# Patient Record
Sex: MALE | Race: WHITE | Employment: OTHER | URBAN - NONMETROPOLITAN AREA
[De-identification: names, ages, dates, MRNs, and addresses within clinical notes are randomized per-mention and may not be internally consistent; named-entity substitution may affect disease eponyms.]

---

## 2017-01-18 RX ORDER — ALPRAZOLAM 0.5 MG/1
TABLET ORAL
Qty: 120 TABLET | Refills: 2 | Status: SHIPPED | OUTPATIENT
Start: 2017-01-18 | End: 2017-03-23 | Stop reason: SDUPTHER

## 2017-03-24 RX ORDER — ALPRAZOLAM 0.5 MG/1
TABLET ORAL
Qty: 120 TABLET | Refills: 2 | Status: SHIPPED | OUTPATIENT
Start: 2017-03-24 | End: 2017-05-30 | Stop reason: SDUPTHER

## 2017-04-28 ENCOUNTER — TELEPHONE (OUTPATIENT)
Dept: FAMILY MEDICINE CLINIC | Age: 57
End: 2017-04-28

## 2017-06-06 ENCOUNTER — OFFICE VISIT (OUTPATIENT)
Dept: FAMILY MEDICINE CLINIC | Age: 57
End: 2017-06-06

## 2017-06-06 VITALS
SYSTOLIC BLOOD PRESSURE: 130 MMHG | HEIGHT: 71 IN | TEMPERATURE: 97.6 F | WEIGHT: 158.6 LBS | HEART RATE: 69 BPM | BODY MASS INDEX: 22.2 KG/M2 | DIASTOLIC BLOOD PRESSURE: 80 MMHG | OXYGEN SATURATION: 91 %

## 2017-06-06 DIAGNOSIS — G89.29 CHRONIC LOW BACK PAIN, UNSPECIFIED BACK PAIN LATERALITY, WITH SCIATICA PRESENCE UNSPECIFIED: ICD-10-CM

## 2017-06-06 DIAGNOSIS — R10.84 ABDOMINAL PAIN, GENERALIZED: ICD-10-CM

## 2017-06-06 DIAGNOSIS — M54.5 CHRONIC LOW BACK PAIN, UNSPECIFIED BACK PAIN LATERALITY, WITH SCIATICA PRESENCE UNSPECIFIED: ICD-10-CM

## 2017-06-06 DIAGNOSIS — R53.83 OTHER MALAISE AND FATIGUE: ICD-10-CM

## 2017-06-06 DIAGNOSIS — R53.81 OTHER MALAISE AND FATIGUE: ICD-10-CM

## 2017-06-06 DIAGNOSIS — Z12.5 SCREENING PSA (PROSTATE SPECIFIC ANTIGEN): ICD-10-CM

## 2017-06-06 DIAGNOSIS — R63.4 WEIGHT LOSS: ICD-10-CM

## 2017-06-06 DIAGNOSIS — K58.2 IRRITABLE BOWEL SYNDROME WITH BOTH CONSTIPATION AND DIARRHEA: ICD-10-CM

## 2017-06-06 DIAGNOSIS — R63.4 WEIGHT LOSS: Primary | ICD-10-CM

## 2017-06-06 DIAGNOSIS — F10.11 HISTORY OF ALCOHOL ABUSE: ICD-10-CM

## 2017-06-06 LAB
ALBUMIN SERPL-MCNC: 4.5 G/DL (ref 3.9–4.9)
ALP BLD-CCNC: 79 U/L (ref 35–104)
ALT SERPL-CCNC: 10 U/L (ref 0–41)
ANION GAP SERPL CALCULATED.3IONS-SCNC: 11 MEQ/L (ref 7–13)
AST SERPL-CCNC: 13 U/L (ref 0–40)
BILIRUB SERPL-MCNC: 0.4 MG/DL (ref 0–1.2)
BUN BLDV-MCNC: 15 MG/DL (ref 6–20)
C-REACTIVE PROTEIN: <0.3 MG/L (ref 0–5)
CALCIUM SERPL-MCNC: 9.2 MG/DL (ref 8.6–10.2)
CHLORIDE BLD-SCNC: 99 MEQ/L (ref 98–107)
CO2: 28 MEQ/L (ref 22–29)
CREAT SERPL-MCNC: 0.57 MG/DL (ref 0.7–1.2)
GFR AFRICAN AMERICAN: >60
GFR NON-AFRICAN AMERICAN: >60
GLOBULIN: 2.2 G/DL (ref 2.3–3.5)
GLUCOSE BLD-MCNC: 76 MG/DL (ref 74–109)
HCT VFR BLD CALC: 39.5 % (ref 42–52)
HEMOGLOBIN: 13.1 G/DL (ref 14–18)
MCH RBC QN AUTO: 31.7 PG (ref 27–31.3)
MCHC RBC AUTO-ENTMCNC: 33.1 % (ref 33–37)
MCV RBC AUTO: 95.7 FL (ref 80–100)
PDW BLD-RTO: 14.2 % (ref 11.5–14.5)
PLATELET # BLD: 212 K/UL (ref 130–400)
POTASSIUM SERPL-SCNC: 4.4 MEQ/L (ref 3.5–5.1)
PREALBUMIN: 26 MG/DL (ref 20–40)
PROSTATE SPECIFIC ANTIGEN: 2.4 NG/ML (ref 0–3.89)
RBC # BLD: 4.13 M/UL (ref 4.7–6.1)
SEDIMENTATION RATE, ERYTHROCYTE: 5 MM (ref 0–20)
SODIUM BLD-SCNC: 138 MEQ/L (ref 132–144)
TOTAL PROTEIN: 6.7 G/DL (ref 6.4–8.1)
TSH SERPL DL<=0.05 MIU/L-ACNC: 3.12 UIU/ML (ref 0.27–4.2)
WBC # BLD: 4.3 K/UL (ref 4.8–10.8)

## 2017-06-06 PROCEDURE — 99213 OFFICE O/P EST LOW 20 MIN: CPT | Performed by: FAMILY MEDICINE

## 2017-06-06 PROCEDURE — G8599 NO ASA/ANTIPLAT THER USE RNG: HCPCS | Performed by: FAMILY MEDICINE

## 2017-06-06 PROCEDURE — G8420 CALC BMI NORM PARAMETERS: HCPCS | Performed by: FAMILY MEDICINE

## 2017-06-06 PROCEDURE — 3017F COLORECTAL CA SCREEN DOC REV: CPT | Performed by: FAMILY MEDICINE

## 2017-06-06 PROCEDURE — 1036F TOBACCO NON-USER: CPT | Performed by: FAMILY MEDICINE

## 2017-06-06 PROCEDURE — G8427 DOCREV CUR MEDS BY ELIG CLIN: HCPCS | Performed by: FAMILY MEDICINE

## 2017-06-06 RX ORDER — GABAPENTIN 600 MG/1
TABLET ORAL
Qty: 180 TABLET | Refills: 5 | Status: SHIPPED | OUTPATIENT
Start: 2017-06-06 | End: 2018-05-07 | Stop reason: SDUPTHER

## 2017-06-06 ASSESSMENT — PATIENT HEALTH QUESTIONNAIRE - PHQ9
6. FEELING BAD ABOUT YOURSELF - OR THAT YOU ARE A FAILURE OR HAVE LET YOURSELF OR YOUR FAMILY DOWN: 0
SUM OF ALL RESPONSES TO PHQ QUESTIONS 1-9: 8
SUM OF ALL RESPONSES TO PHQ9 QUESTIONS 1 & 2: 3
9. THOUGHTS THAT YOU WOULD BE BETTER OFF DEAD, OR OF HURTING YOURSELF: 0
7. TROUBLE CONCENTRATING ON THINGS, SUCH AS READING THE NEWSPAPER OR WATCHING TELEVISION: 1
8. MOVING OR SPEAKING SO SLOWLY THAT OTHER PEOPLE COULD HAVE NOTICED. OR THE OPPOSITE, BEING SO FIGETY OR RESTLESS THAT YOU HAVE BEEN MOVING AROUND A LOT MORE THAN USUAL: 0
5. POOR APPETITE OR OVEREATING: 0
2. FEELING DOWN, DEPRESSED OR HOPELESS: 1
4. FEELING TIRED OR HAVING LITTLE ENERGY: 3
3. TROUBLE FALLING OR STAYING ASLEEP: 1
1. LITTLE INTEREST OR PLEASURE IN DOING THINGS: 2

## 2017-06-08 LAB — TESTOSTERONE TOTAL-MALE: 692 NG/DL (ref 300–890)

## 2017-06-09 ENCOUNTER — TELEPHONE (OUTPATIENT)
Dept: FAMILY MEDICINE CLINIC | Age: 57
End: 2017-06-09

## 2017-06-15 ENCOUNTER — HOSPITAL ENCOUNTER (OUTPATIENT)
Dept: CT IMAGING | Age: 57
Discharge: HOME | End: 2017-06-15
Payer: COMMERCIAL

## 2017-06-15 VITALS
BODY MASS INDEX: 21.7 KG/M2 | SYSTOLIC BLOOD PRESSURE: 119 MMHG | HEIGHT: 71 IN | WEIGHT: 155 LBS | DIASTOLIC BLOOD PRESSURE: 80 MMHG | HEART RATE: 75 BPM | RESPIRATION RATE: 16 BRPM

## 2017-06-15 DIAGNOSIS — F10.11 HISTORY OF ALCOHOL ABUSE: ICD-10-CM

## 2017-06-15 DIAGNOSIS — K58.2 IRRITABLE BOWEL SYNDROME WITH BOTH CONSTIPATION AND DIARRHEA: ICD-10-CM

## 2017-06-15 DIAGNOSIS — R10.84 ABDOMINAL PAIN, GENERALIZED: ICD-10-CM

## 2017-06-15 DIAGNOSIS — R63.4 WEIGHT LOSS: ICD-10-CM

## 2017-06-15 PROCEDURE — 74177 CT ABD & PELVIS W/CONTRAST: CPT

## 2017-06-15 PROCEDURE — 6360000004 HC RX CONTRAST MEDICATION: Performed by: RADIOLOGY

## 2017-06-15 PROCEDURE — 2500000003 HC RX 250 WO HCPCS: Performed by: RADIOLOGY

## 2017-06-15 RX ORDER — SODIUM CHLORIDE 0.9 % (FLUSH) 0.9 %
10 SYRINGE (ML) INJECTION ONCE
Status: DISCONTINUED | OUTPATIENT
Start: 2017-06-15 | End: 2017-06-18 | Stop reason: HOSPADM

## 2017-06-15 RX ADMIN — IOPAMIDOL 100 ML: 755 INJECTION, SOLUTION INTRAVENOUS at 14:45

## 2017-06-15 RX ADMIN — BARIUM SULFATE 450 ML: 21 SUSPENSION ORAL at 14:45

## 2017-06-19 ENCOUNTER — TELEPHONE (OUTPATIENT)
Dept: FAMILY MEDICINE CLINIC | Age: 57
End: 2017-06-19

## 2017-06-19 DIAGNOSIS — R93.5 ABNORMAL ABDOMINAL CT SCAN: Primary | ICD-10-CM

## 2017-10-19 ENCOUNTER — HOSPITAL ENCOUNTER (OUTPATIENT)
Dept: CARDIOLOGY | Age: 57
Discharge: HOME | End: 2017-10-19
Payer: COMMERCIAL

## 2017-10-19 PROCEDURE — 93296 REM INTERROG EVL PM/IDS: CPT

## 2017-10-24 ENCOUNTER — TELEPHONE (OUTPATIENT)
Dept: FAMILY MEDICINE CLINIC | Age: 57
End: 2017-10-24

## 2017-10-24 NOTE — TELEPHONE ENCOUNTER
Rosalie xiong nurse from [facility]. [phone].  She is case management if she can be of any assistance with the pt's care needs

## 2017-12-12 RX ORDER — TAMSULOSIN HYDROCHLORIDE 0.4 MG/1
0.4 CAPSULE ORAL DAILY
Qty: 90 CAPSULE | Refills: 3 | Status: SHIPPED | OUTPATIENT
Start: 2017-12-12 | End: 2018-11-28 | Stop reason: SDUPTHER

## 2018-01-18 ENCOUNTER — HOSPITAL ENCOUNTER (OUTPATIENT)
Dept: CARDIOLOGY | Age: 58
Discharge: HOME | End: 2018-01-18
Payer: COMMERCIAL

## 2018-01-18 PROCEDURE — 93296 REM INTERROG EVL PM/IDS: CPT

## 2018-03-05 ENCOUNTER — TELEPHONE (OUTPATIENT)
Dept: FAMILY MEDICINE CLINIC | Age: 58
End: 2018-03-05

## 2018-03-06 RX ORDER — ALPRAZOLAM 0.5 MG/1
TABLET ORAL
Qty: 120 TABLET | Refills: 2 | Status: SHIPPED | OUTPATIENT
Start: 2018-03-06 | End: 2018-03-12

## 2018-03-12 ENCOUNTER — OFFICE VISIT (OUTPATIENT)
Dept: FAMILY MEDICINE CLINIC | Age: 58
End: 2018-03-12
Payer: COMMERCIAL

## 2018-03-12 VITALS
HEIGHT: 71 IN | RESPIRATION RATE: 12 BRPM | HEART RATE: 54 BPM | WEIGHT: 152 LBS | DIASTOLIC BLOOD PRESSURE: 72 MMHG | BODY MASS INDEX: 21.28 KG/M2 | SYSTOLIC BLOOD PRESSURE: 110 MMHG

## 2018-03-12 DIAGNOSIS — R11.0 NAUSEA: Primary | ICD-10-CM

## 2018-03-12 DIAGNOSIS — K57.30 DIVERTICULOSIS OF LARGE INTESTINE WITHOUT HEMORRHAGE: ICD-10-CM

## 2018-03-12 DIAGNOSIS — I42.9 CARDIOMYOPATHY, UNSPECIFIED TYPE (HCC): ICD-10-CM

## 2018-03-12 DIAGNOSIS — G89.29 CHRONIC LOW BACK PAIN, UNSPECIFIED BACK PAIN LATERALITY, WITH SCIATICA PRESENCE UNSPECIFIED: ICD-10-CM

## 2018-03-12 DIAGNOSIS — M54.5 CHRONIC LOW BACK PAIN, UNSPECIFIED BACK PAIN LATERALITY, WITH SCIATICA PRESENCE UNSPECIFIED: ICD-10-CM

## 2018-03-12 DIAGNOSIS — D64.9 ANEMIA, UNSPECIFIED TYPE: ICD-10-CM

## 2018-03-12 DIAGNOSIS — K27.9 PEPTIC ULCER DISEASE: ICD-10-CM

## 2018-03-12 DIAGNOSIS — K58.0 IRRITABLE BOWEL SYNDROME WITH DIARRHEA: ICD-10-CM

## 2018-03-12 DIAGNOSIS — I25.119 CORONARY ARTERY DISEASE INVOLVING NATIVE HEART WITH ANGINA PECTORIS, UNSPECIFIED VESSEL OR LESION TYPE (HCC): ICD-10-CM

## 2018-03-12 DIAGNOSIS — E78.5 HYPERLIPIDEMIA, UNSPECIFIED HYPERLIPIDEMIA TYPE: ICD-10-CM

## 2018-03-12 PROCEDURE — G8420 CALC BMI NORM PARAMETERS: HCPCS | Performed by: FAMILY MEDICINE

## 2018-03-12 PROCEDURE — 1036F TOBACCO NON-USER: CPT | Performed by: FAMILY MEDICINE

## 2018-03-12 PROCEDURE — 3017F COLORECTAL CA SCREEN DOC REV: CPT | Performed by: FAMILY MEDICINE

## 2018-03-12 PROCEDURE — G8427 DOCREV CUR MEDS BY ELIG CLIN: HCPCS | Performed by: FAMILY MEDICINE

## 2018-03-12 PROCEDURE — G8483 FLU IMM NO ADMIN DOC REA: HCPCS | Performed by: FAMILY MEDICINE

## 2018-03-12 PROCEDURE — G8599 NO ASA/ANTIPLAT THER USE RNG: HCPCS | Performed by: FAMILY MEDICINE

## 2018-03-12 PROCEDURE — 99214 OFFICE O/P EST MOD 30 MIN: CPT | Performed by: FAMILY MEDICINE

## 2018-03-12 RX ORDER — ONDANSETRON 4 MG/1
4 TABLET, ORALLY DISINTEGRATING ORAL EVERY 8 HOURS PRN
Qty: 30 TABLET | Refills: 2 | Status: SHIPPED | OUTPATIENT
Start: 2018-03-12 | End: 2019-02-20

## 2018-03-12 NOTE — PROGRESS NOTES
Chief Complaint   Patient presents with    Annual Exam     saw Dr. Mary Aguilar last June got a letter about seeing [facility]     check left ear, fluid in ear fof 5 days    Nausea     would like medication that does not cause constipation        HPI:  Nura Service is a 59 y.o. male    Checkup/review chronic issues    Nausea, requesting med    Would like left ear checked for infection/fluid    He has a pacemaker  Nonischemic cardiomyopathy  Follows with cardiology  Dr. Екатерина Rios  Pacemaker checks every 3 months, in person every 6 mos  Will have echo  Taking lipitor    Hx substance abuse  Was taken off all controlled substances at one time  Xanax was restarted   Takes 4 xanax/day    IBS history  Can go 7 times a day, remains bloated, and feels like inadequate emptying  linzess at higher dose caused diarrhea    Patient Active Problem List   Diagnosis    Generalized pain    High risk medication use    IBS (irritable bowel syndrome)    Anxiety    Kidney disease    Anemia    Diverticulosis    Peptic ulcer disease    CAD (coronary artery disease)    Substance abuse    Chronic back pain    Chemical dependency ([address]    Cardiomyopathy [facility] [address]    Hyperlipidemia       Current Outpatient Prescriptions   Medication Sig Dispense Refill    ondansetron (ZOFRAN ODT) 4 MG disintegrating tablet Take 1 tablet by mouth every 8 hours as needed for Nausea or Vomiting 30 tablet 2    tamsulosin (FLOMAX) 0.4 MG capsule Take 1 capsule by mouth daily 90 capsule 3    desonide (DESOWEN) 0.05 % cream apply to affected area twice a day 15 g 3    gabapentin (NEURONTIN) 600 MG tablet TAKE 1 TABLET BY MOUTH SIX TIMES A  tablet 5    linaclotide (LINZESS) 145 MCG capsule Take 1 capsule by mouth every morning (before breakfast) 30 capsule 3    Nerve Stimulator (ALEVE DIRECT THERAPY TENS) DREW Use daily.   Dx: chronic back pain 1 Device 0    ALPRAZolam (XANAX) 0.5 MG tablet TAKE 1 TO 2 TABLETS BY MOUTH THREE TIMES A DAY AS

## 2018-04-19 ENCOUNTER — HOSPITAL ENCOUNTER (OUTPATIENT)
Dept: CARDIOLOGY | Age: 58
Discharge: HOME | End: 2018-04-19
Payer: COMMERCIAL

## 2018-04-19 PROCEDURE — 93296 REM INTERROG EVL PM/IDS: CPT

## 2018-05-01 RX ORDER — ALPRAZOLAM 0.5 MG/1
TABLET ORAL
Qty: 120 TABLET | Refills: 2 | Status: SHIPPED | OUTPATIENT
Start: 2018-05-01 | End: 2018-06-26 | Stop reason: SDUPTHER

## 2018-05-07 RX ORDER — GABAPENTIN 600 MG/1
TABLET ORAL
Qty: 180 TABLET | Refills: 5 | Status: SHIPPED | OUTPATIENT
Start: 2018-05-07 | End: 2018-11-15 | Stop reason: SDUPTHER

## 2018-08-24 DIAGNOSIS — F41.9 ANXIETY: ICD-10-CM

## 2018-08-27 RX ORDER — ALPRAZOLAM 0.5 MG/1
TABLET ORAL
Qty: 120 TABLET | Refills: 2 | Status: SHIPPED | OUTPATIENT
Start: 2018-08-27 | End: 2018-10-25 | Stop reason: SDUPTHER

## 2018-10-25 DIAGNOSIS — F41.9 ANXIETY: ICD-10-CM

## 2018-10-26 RX ORDER — ALPRAZOLAM 0.5 MG/1
TABLET ORAL
Qty: 120 TABLET | Refills: 2 | Status: SHIPPED | OUTPATIENT
Start: 2018-10-26 | End: 2018-12-31 | Stop reason: SDUPTHER

## 2018-11-15 ENCOUNTER — TELEPHONE (OUTPATIENT)
Dept: UROLOGY | Age: 58
End: 2018-11-15

## 2018-11-15 DIAGNOSIS — N13.8 BPH WITH OBSTRUCTION/LOWER URINARY TRACT SYMPTOMS: Primary | ICD-10-CM

## 2018-11-15 DIAGNOSIS — N40.1 BPH WITH OBSTRUCTION/LOWER URINARY TRACT SYMPTOMS: Primary | ICD-10-CM

## 2018-11-15 RX ORDER — GABAPENTIN 600 MG/1
TABLET ORAL
Qty: 180 TABLET | Refills: 5 | Status: SHIPPED | OUTPATIENT
Start: 2018-11-15 | End: 2019-05-23 | Stop reason: SDUPTHER

## 2018-11-28 RX ORDER — TAMSULOSIN HYDROCHLORIDE 0.4 MG/1
CAPSULE ORAL
Qty: 90 CAPSULE | Refills: 3 | Status: SHIPPED | OUTPATIENT
Start: 2018-11-28 | End: 2019-02-20

## 2018-12-31 DIAGNOSIS — F41.9 ANXIETY: ICD-10-CM

## 2018-12-31 RX ORDER — ALPRAZOLAM 0.5 MG/1
TABLET ORAL
Qty: 120 TABLET | Refills: 2 | Status: SHIPPED | OUTPATIENT
Start: 2018-12-31 | End: 2019-02-20

## 2019-01-22 RX ORDER — DESONIDE 0.5 MG/G
CREAM TOPICAL
Qty: 15 G | Refills: 3 | Status: SHIPPED | OUTPATIENT
Start: 2019-01-22 | End: 2019-02-20

## 2019-02-20 ENCOUNTER — OFFICE VISIT (OUTPATIENT)
Dept: FAMILY MEDICINE CLINIC | Age: 59
End: 2019-02-20
Payer: COMMERCIAL

## 2019-02-20 VITALS
HEIGHT: 71 IN | RESPIRATION RATE: 16 BRPM | BODY MASS INDEX: 20.58 KG/M2 | SYSTOLIC BLOOD PRESSURE: 108 MMHG | DIASTOLIC BLOOD PRESSURE: 80 MMHG | WEIGHT: 147 LBS

## 2019-02-20 DIAGNOSIS — N28.9 KIDNEY DISEASE: ICD-10-CM

## 2019-02-20 DIAGNOSIS — D64.9 ANEMIA, UNSPECIFIED TYPE: ICD-10-CM

## 2019-02-20 DIAGNOSIS — I25.119 CORONARY ARTERY DISEASE INVOLVING NATIVE HEART WITH ANGINA PECTORIS, UNSPECIFIED VESSEL OR LESION TYPE (HCC): ICD-10-CM

## 2019-02-20 DIAGNOSIS — M54.5 CHRONIC LOW BACK PAIN, UNSPECIFIED BACK PAIN LATERALITY, WITH SCIATICA PRESENCE UNSPECIFIED: ICD-10-CM

## 2019-02-20 DIAGNOSIS — E78.5 HYPERLIPIDEMIA, UNSPECIFIED HYPERLIPIDEMIA TYPE: ICD-10-CM

## 2019-02-20 DIAGNOSIS — F19.10 SUBSTANCE ABUSE (HCC): ICD-10-CM

## 2019-02-20 DIAGNOSIS — F41.9 ANXIETY: ICD-10-CM

## 2019-02-20 DIAGNOSIS — R52 GENERALIZED PAIN: ICD-10-CM

## 2019-02-20 DIAGNOSIS — F19.20 CHEMICAL DEPENDENCY (HCC): ICD-10-CM

## 2019-02-20 DIAGNOSIS — I42.9 CARDIOMYOPATHY, UNSPECIFIED TYPE (HCC): Primary | ICD-10-CM

## 2019-02-20 DIAGNOSIS — K58.0 IRRITABLE BOWEL SYNDROME WITH DIARRHEA: ICD-10-CM

## 2019-02-20 DIAGNOSIS — G89.29 CHRONIC LOW BACK PAIN, UNSPECIFIED BACK PAIN LATERALITY, WITH SCIATICA PRESENCE UNSPECIFIED: ICD-10-CM

## 2019-02-20 DIAGNOSIS — Z79.899 HIGH RISK MEDICATION USE: ICD-10-CM

## 2019-02-20 LAB
ALBUMIN SERPL-MCNC: 4.4 G/DL (ref 3.5–4.6)
ALP BLD-CCNC: 65 U/L (ref 35–104)
ALT SERPL-CCNC: 8 U/L (ref 0–41)
ANION GAP SERPL CALCULATED.3IONS-SCNC: 13 MEQ/L (ref 9–15)
AST SERPL-CCNC: 16 U/L (ref 0–40)
BILIRUB SERPL-MCNC: 0.4 MG/DL (ref 0.2–0.7)
BUN BLDV-MCNC: 15 MG/DL (ref 8–23)
CALCIUM SERPL-MCNC: 9 MG/DL (ref 8.5–9.9)
CHLORIDE BLD-SCNC: 98 MEQ/L (ref 95–107)
CHOLESTEROL, TOTAL: 221 MG/DL (ref 0–199)
CO2: 27 MEQ/L (ref 20–31)
CREAT SERPL-MCNC: 0.75 MG/DL (ref 0.7–1.2)
GFR AFRICAN AMERICAN: >60
GFR NON-AFRICAN AMERICAN: >60
GLOBULIN: 2.8 G/DL (ref 2.3–3.5)
GLUCOSE BLD-MCNC: 74 MG/DL (ref 70–99)
HCT VFR BLD CALC: 43.7 % (ref 42–52)
HDLC SERPL-MCNC: 101 MG/DL (ref 40–59)
HEMOGLOBIN: 14.7 G/DL (ref 14–18)
LDL CHOLESTEROL CALCULATED: 105 MG/DL (ref 0–129)
MCH RBC QN AUTO: 34.4 PG (ref 27–31.3)
MCHC RBC AUTO-ENTMCNC: 33.8 % (ref 33–37)
MCV RBC AUTO: 101.8 FL (ref 80–100)
PDW BLD-RTO: 12.9 % (ref 11.5–14.5)
PLATELET # BLD: 214 K/UL (ref 130–400)
POTASSIUM SERPL-SCNC: 4.1 MEQ/L (ref 3.4–4.9)
RBC # BLD: 4.29 M/UL (ref 4.7–6.1)
SODIUM BLD-SCNC: 138 MEQ/L (ref 135–144)
TOTAL PROTEIN: 7.2 G/DL (ref 6.3–8)
TRIGL SERPL-MCNC: 73 MG/DL (ref 0–150)
TSH SERPL DL<=0.05 MIU/L-ACNC: 3.81 UIU/ML (ref 0.44–3.86)
WBC # BLD: 4.4 K/UL (ref 4.8–10.8)

## 2019-02-20 PROCEDURE — G8427 DOCREV CUR MEDS BY ELIG CLIN: HCPCS | Performed by: FAMILY MEDICINE

## 2019-02-20 PROCEDURE — 1036F TOBACCO NON-USER: CPT | Performed by: FAMILY MEDICINE

## 2019-02-20 PROCEDURE — G8599 NO ASA/ANTIPLAT THER USE RNG: HCPCS | Performed by: FAMILY MEDICINE

## 2019-02-20 PROCEDURE — 3017F COLORECTAL CA SCREEN DOC REV: CPT | Performed by: FAMILY MEDICINE

## 2019-02-20 PROCEDURE — G8420 CALC BMI NORM PARAMETERS: HCPCS | Performed by: FAMILY MEDICINE

## 2019-02-20 PROCEDURE — G8483 FLU IMM NO ADMIN DOC REA: HCPCS | Performed by: FAMILY MEDICINE

## 2019-02-20 PROCEDURE — 99214 OFFICE O/P EST MOD 30 MIN: CPT | Performed by: FAMILY MEDICINE

## 2019-02-20 RX ORDER — POLYETHYLENE GLYCOL 3350 17 G/17G
17 POWDER, FOR SOLUTION ORAL DAILY
Qty: 30 EACH | Refills: 11 | Status: SHIPPED | OUTPATIENT
Start: 2019-02-20 | End: 2020-05-05

## 2019-02-20 ASSESSMENT — PATIENT HEALTH QUESTIONNAIRE - PHQ9
2. FEELING DOWN, DEPRESSED OR HOPELESS: 0
SUM OF ALL RESPONSES TO PHQ9 QUESTIONS 1 & 2: 0
SUM OF ALL RESPONSES TO PHQ QUESTIONS 1-9: 0
1. LITTLE INTEREST OR PLEASURE IN DOING THINGS: 0
SUM OF ALL RESPONSES TO PHQ QUESTIONS 1-9: 0

## 2019-04-30 DIAGNOSIS — F41.9 ANXIETY: ICD-10-CM

## 2019-04-30 RX ORDER — ALPRAZOLAM 0.5 MG/1
TABLET ORAL
Qty: 120 TABLET | Refills: 2 | Status: SHIPPED | OUTPATIENT
Start: 2019-04-30 | End: 2019-06-26 | Stop reason: SDUPTHER

## 2019-05-23 RX ORDER — GABAPENTIN 600 MG/1
TABLET ORAL
Qty: 180 TABLET | Refills: 5 | Status: SHIPPED | OUTPATIENT
Start: 2019-05-23 | End: 2020-04-02

## 2019-10-20 DIAGNOSIS — F41.9 ANXIETY: ICD-10-CM

## 2019-10-22 RX ORDER — ALPRAZOLAM 0.5 MG/1
TABLET ORAL
Qty: 120 TABLET | Refills: 2 | Status: SHIPPED | OUTPATIENT
Start: 2019-10-22 | End: 2019-12-17 | Stop reason: SDUPTHER

## 2019-10-29 DIAGNOSIS — N40.1 BPH WITH OBSTRUCTION/LOWER URINARY TRACT SYMPTOMS: Primary | ICD-10-CM

## 2019-10-29 DIAGNOSIS — N13.8 BPH WITH OBSTRUCTION/LOWER URINARY TRACT SYMPTOMS: Primary | ICD-10-CM

## 2019-10-30 RX ORDER — TAMSULOSIN HYDROCHLORIDE 0.4 MG/1
0.4 CAPSULE ORAL DAILY
Qty: 90 CAPSULE | Refills: 3 | Status: SHIPPED | OUTPATIENT
Start: 2019-10-30 | End: 2020-05-05 | Stop reason: SDUPTHER

## 2019-12-17 DIAGNOSIS — F41.9 ANXIETY: ICD-10-CM

## 2019-12-18 RX ORDER — ALPRAZOLAM 0.5 MG/1
TABLET ORAL
Qty: 120 TABLET | Refills: 0 | Status: SHIPPED | OUTPATIENT
Start: 2019-12-18 | End: 2020-01-09

## 2020-01-08 NOTE — TELEPHONE ENCOUNTER
Pharmacy  requesting medication refill.  Please approve or deny this request.    Rx requested:  Requested Prescriptions     Pending Prescriptions Disp Refills    ALPRAZolam (XANAX) 0.5 MG tablet [Pharmacy Med Name: ALPRAZOLAM 0.5 MG TABLET] 120 tablet      Sig: take 1 to 2 tablets by mouth three times a day if needed for sleep         Last Office Visit:   2/20/2019      Next Visit Date:  Future Appointments   Date Time Provider Yareli Torrie   1/16/2020  2:45 PM Esmer Núñez MD [facility]

## 2020-01-09 RX ORDER — ALPRAZOLAM 0.5 MG/1
TABLET ORAL
Qty: 120 TABLET | Refills: 2 | Status: SHIPPED | OUTPATIENT
Start: 2020-01-09 | End: 2020-03-06

## 2020-03-06 RX ORDER — ALPRAZOLAM 0.5 MG/1
TABLET ORAL
Qty: 120 TABLET | Refills: 2 | Status: SHIPPED | OUTPATIENT
Start: 2020-03-06 | End: 2020-05-05 | Stop reason: SDUPTHER

## 2020-03-06 RX ORDER — DESONIDE 0.5 MG/G
CREAM TOPICAL
Qty: 15 G | Refills: 3 | Status: SHIPPED | OUTPATIENT
Start: 2020-03-06 | End: 2021-08-05

## 2020-04-02 RX ORDER — GABAPENTIN 600 MG/1
TABLET ORAL
Qty: 180 TABLET | Refills: 5 | Status: SHIPPED | OUTPATIENT
Start: 2020-04-02 | End: 2020-07-06

## 2020-05-05 ENCOUNTER — OFFICE VISIT (OUTPATIENT)
Dept: FAMILY MEDICINE CLINIC | Age: 60
End: 2020-05-05
Payer: COMMERCIAL

## 2020-05-05 VITALS — HEIGHT: 71 IN | WEIGHT: 155 LBS | BODY MASS INDEX: 21.7 KG/M2

## 2020-05-05 PROCEDURE — 99442 PR PHYS/QHP TELEPHONE EVALUATION 11-20 MIN: CPT | Performed by: FAMILY MEDICINE

## 2020-05-05 ASSESSMENT — PATIENT HEALTH QUESTIONNAIRE - PHQ9
SUM OF ALL RESPONSES TO PHQ QUESTIONS 1-9: 0
SUM OF ALL RESPONSES TO PHQ QUESTIONS 1-9: 0
2. FEELING DOWN, DEPRESSED OR HOPELESS: 0
1. LITTLE INTEREST OR PLEASURE IN DOING THINGS: 0
SUM OF ALL RESPONSES TO PHQ9 QUESTIONS 1 & 2: 0

## 2020-05-05 NOTE — PROGRESS NOTES
Substance abuse ([facility] [address]     ULTRAM         Review of Systems  Otherwise physically feels healthy without sob/palpitations/chest pain/f/c/n/v/weight gain/weight loss/abd pain    Prior to Visit Medications    Medication Sig Taking? Authorizing Provider   gabapentin (NEURONTIN) 600 MG tablet take 1 tablet by mouth six times a day Yes Sonia Patton MD   ALPRAZolam (XANAX) 0.5 MG tablet TAKE 1 TO 2 TABLETS BY MOUTH THREE TIMES A DAY AS NEEDED FOR SLEEP Yes Sonia Patton MD   tamsulosin (FLOMAX) 0.4 MG capsule take 1 capsule by mouth once daily Yes Sonia Patton MD   omeprazole (PRILOSEC) 40 MG capsule Take 40 mg by mouth daily. Yes Historical Provider, MD   carvedilol (COREG) 6.25 MG tablet Take 6.25 mg by mouth 2 times daily (with meals). Yes Historical Provider, MD   lisinopril (ZESTRIL) 2.5 MG tablet Take 2.5 mg by mouth daily. Yes Historical Provider, MD   desonide (DESOWEN) 0.05 % cream APPLY TO THE AFFECTED AREA TWICE A DAY  Sonia Patton MD       Social History     Tobacco Use    Smoking status: Former Smoker     Packs/day: 1.50     Years: 20.00     Pack years: 30.00     Types: Cigarettes     Last attempt to quit: 10/13/1997     Years since quittin.5    Smokeless tobacco: Never Used   Substance Use Topics    Alcohol use: No     Comment: H/O SUBSTANCE ABUSE INCLUDING ALCOHOL    Drug use: No     Comment: CHEMICAL DEPENDENCY/ADDICTION            PHYSICAL EXAMINATION:  Ht 5' 11\" (1.803 m)   Wt 155 lb (70.3 kg)   BMI 21.62 kg/m²     No exam  Phone visit  He sounds a little fragmented on the phone  Reports that he has a triscuit stuck in his throat    Due to this being a TeleHealth encounter, evaluation of the following organ systems is limited: Vitals/Constitutional/EENT/Resp/CV/GI//MS/Neuro/Skin/Heme-Lymph-Imm.     Lab Results   Component Value Date    WBC 4.4 (L) 2019    HGB 14.7 2019    HCT 43.7 2019     2019    CHOL 221 (H) 2019    TRIG 73 2019     (H) 02/20/2019    ALT 8 02/20/2019    AST 16 02/20/2019     02/20/2019    K 4.1 02/20/2019    CL 98 02/20/2019    CREATININE 0.75 02/20/2019    BUN 15 02/20/2019    CO2 27 02/20/2019    TSH 3.810 02/20/2019    PSA 2.40 06/06/2017    INR 1.2 07/17/2013         ASSESSMENT/PLAN:     Diagnosis Orders   1. Anxiety  Comprehensive Metabolic Panel    CBC    TSH without Reflex   2. High risk medication use  Pain Management Drug Screen   3. Coronary artery disease involving native heart with angina pectoris, unspecified vessel or lesion type ([facility] [address]     4. Chronic low back pain, unspecified back pain laterality, unspecified whether sciatica present  Pain Management Drug Screen   5. Chemical dependency (HCC)     6. Substance abuse (HCC)  Pain Management Drug Screen   7. Cardiomyopathy, unspecified type ([facility] [address]     8. Irritable bowel syndrome with diarrhea     9. Generalized pain           Return in about 2 months (around 7/5/2020) for needs in office appt this summer . Would like him to cut down on the xanax  No more than 4/day at most 5    He sounded a little unusual on the phone today    This is a total of 2-2.5mg/day, which is reasonable    He is cutting down on his gabapentin usage he states, but he does fill the prescriptions monthly    Has appt in July with cardiology    In office visit soon with drug testing    An  electronic signature was used to authenticate this note. --Colby Champion MD on 5/5/2020 at 2:31 PM        Pursuant to the emergency declaration under the [facility] waiver authority and the [employer] and [employer] Act, this Virtual  Visit was conducted, with patient's consent, to reduce the patient's risk of exposure to COVID-19 and provide continuity of care for an established patient.      11 minute call

## 2020-05-12 RX ORDER — ALPRAZOLAM 0.5 MG/1
TABLET ORAL
Qty: 120 TABLET | Refills: 0 | Status: SHIPPED | OUTPATIENT
Start: 2020-05-12 | End: 2020-06-11

## 2020-06-11 RX ORDER — ALPRAZOLAM 0.5 MG/1
TABLET ORAL
Qty: 120 TABLET | Refills: 0 | Status: SHIPPED | OUTPATIENT
Start: 2020-06-11 | End: 2020-07-06 | Stop reason: SDUPTHER

## 2020-07-06 ENCOUNTER — VIRTUAL VISIT (OUTPATIENT)
Dept: FAMILY MEDICINE CLINIC | Age: 60
End: 2020-07-06
Payer: COMMERCIAL

## 2020-07-06 PROCEDURE — 99441 PR PHYS/QHP TELEPHONE EVALUATION 5-10 MIN: CPT | Performed by: FAMILY MEDICINE

## 2020-07-06 RX ORDER — ALPRAZOLAM 0.5 MG/1
TABLET ORAL
Qty: 120 TABLET | Refills: 0 | Status: SHIPPED | OUTPATIENT
Start: 2020-07-06 | End: 2020-08-11

## 2020-07-06 NOTE — PROGRESS NOTES
2020    TELEHEALTH EVALUATION -- Audio/Visual (During HHQQZ-15 public health emergency)    Due to Iron 19 outbreak, patient's office visit was converted to a virtual visit. Patient was contacted and agreed to proceed with a virtual visit via Telephone Visit  The risks and benefits of converting to a virtual visit were discussed in light of the current infectious disease epidemic. Patient also understood that insurance coverage and co-pays are up to their individual insurance plans.     HPI:    Dean Cristobal (:  1960) has requested an audio/video evaluation for the following concern(s):  Chief Complaint   Patient presents with    Anxiety     2 month       Follow up/med refill    Needs xanax refill  Have been doing monthly, no auto fills as he was filling early at times    Having loose stool today  Maybe ate something bad, didn't sleep well    No f/c/v  Some mild nausea  No cough or SOB    Patient Active Problem List   Diagnosis    Generalized pain    High risk medication use    IBS (irritable bowel syndrome)    Anxiety    Kidney disease    Anemia    Diverticulosis    Peptic ulcer disease    CAD (coronary artery disease)    Substance abuse ([address]    Chronic back pain    Chemical dependency ([address]    Cardiomyopathy ([address]    Hyperlipidemia     Past Medical History:   Diagnosis Date    Alcoholic ([address]     20 years ago    Anxiety     Arthritis     Asthma     CAD (coronary artery disease)     Chemical dependency ([address] AUG 2010 PER PT    Chronic back pain     Diverticulosis     Fibromyalgia     Hyperlipidemia     IBS (irritable bowel syndrome) 10/13/2011    IV drug abuse ([address]     Nephrotic syndrome     remission    Osteoarthritis     Panic attacks     Peptic ulcer disease     Rhabdomyolysis 2013    Substance abuse ([address]     ULTRAM         Review of Systems  Otherwise physically feels healthy without sob/palpitations/chest pain/weight gain/weight loss/abd pain      Prior to Visit Medications    Medication Sig Taking? Authorizing Provider   ALPRAZolam (XANAX) 0.5 MG tablet TAKE 1 TO 2 TABLETS BY MOUTH 3 TIMES A DAY AS NEEDED FOR SLEEP. THIS IS MEANT TO BE A 30 DAY SUPPLY. Yes Holly Dennison MD   desonide (DESOWEN) 0.05 % cream APPLY TO THE AFFECTED AREA TWICE A DAY Yes Holly Dennison MD   tamsulosin (FLOMAX) 0.4 MG capsule take 1 capsule by mouth once daily Yes Holly Dennison MD   omeprazole (PRILOSEC) 40 MG capsule Take 40 mg by mouth daily. Yes Historical Provider, MD   carvedilol (COREG) 6.25 MG tablet Take 6.25 mg by mouth 2 times daily (with meals). Yes Historical Provider, MD   lisinopril (ZESTRIL) 2.5 MG tablet Take 2.5 mg by mouth daily. Yes Historical Provider, MD       Social History     Tobacco Use    Smoking status: Former Smoker     Packs/day: 1.50     Years: 20.00     Pack years: 30.00     Types: Cigarettes     Last attempt to quit: 10/13/1997     Years since quittin.7    Smokeless tobacco: Never Used   Substance Use Topics    Alcohol use: No     Comment: H/O SUBSTANCE ABUSE INCLUDING ALCOHOL    Drug use: No     Comment: CHEMICAL DEPENDENCY/ADDICTION            PHYSICAL EXAMINATION:  There were no vitals taken for this visit. No exam  Phone visit  Patient in no significant distress, conversant    Due to this being a TeleHealth encounter, evaluation of the following organ systems is limited: Vitals/Constitutional/EENT/Resp/CV/GI//MS/Neuro/Skin/Heme-Lymph-Imm. Lab Results   Component Value Date    WBC 4.4 (L) 2019    HGB 14.7 2019    HCT 43.7 2019     2019    CHOL 221 (H) 2019    TRIG 73 2019     (H) 2019    ALT 8 2019    AST 16 2019     2019    K 4.1 2019    CL 98 2019    CREATININE 0.75 2019    BUN 15 2019    CO2 27 2019    TSH 3.810 2019    PSA 2.40 2017    INR 1.2 2013         ASSESSMENT/PLAN:     Diagnosis Orders   1.  Anxiety ALPRAZolam (XANAX) 0.5 MG tablet     Return for ordered bloodwork    Controlled Substance Monitoring:    Acute and Chronic Pain Monitoring:   RX Monitoring 7/6/2020   Attestation -   Periodic Controlled Substance Monitoring Possible medication side effects, risk of tolerance/dependence & alternative treatments discussed. ;No signs of potential drug abuse or diversion identified. He has basically stopped gabapentin  Takes maybe 1-2 times/week for nerve pain    No follow-ups on file. An  electronic signature was used to authenticate this note. --Simona Hodgkins, MD on 7/6/2020 at 1:40 PM        Pursuant to the emergency declaration under the [facility], [facility] waiver authority and the [employer] and [employer] Act, this Virtual  Visit was conducted, with patient's consent, to reduce the patient's risk of exposure to COVID-19 and provide continuity of care for an established patient.     5 minute call

## 2020-08-03 ENCOUNTER — TELEPHONE (OUTPATIENT)
Dept: FAMILY MEDICINE CLINIC | Age: 60
End: 2020-08-03

## 2020-08-03 NOTE — TELEPHONE ENCOUNTER
Pt states that he Is extreamly scared to come in to the office at all. He does not want to complete any labs  Until there is a vaccine for this covid -19.      FYI

## 2020-08-10 NOTE — TELEPHONE ENCOUNTER
Pt has not been seen since 2016. He wants to know if he can do a telephone visit with you instead of coming to the office for an appointment?

## 2020-08-11 RX ORDER — TAMSULOSIN HYDROCHLORIDE 0.4 MG/1
CAPSULE ORAL
Qty: 30 CAPSULE | Refills: 5 | Status: SHIPPED | OUTPATIENT
Start: 2020-08-11 | End: 2020-09-03 | Stop reason: SDUPTHER

## 2020-08-11 RX ORDER — ALPRAZOLAM 0.5 MG/1
TABLET ORAL
Qty: 42 TABLET | Refills: 0 | Status: SHIPPED | OUTPATIENT
Start: 2020-08-11 | End: 2020-08-17

## 2020-08-11 NOTE — TELEPHONE ENCOUNTER
LOV 7/6/2020   Per [facility]     patient states he was told by urology to request refill from pcp.      Patient also states he is afraid to complete labs right now due to compromised immune system, and will wait till a vaccine is made before completing lab work.

## 2020-08-12 RX ORDER — ALPRAZOLAM 0.5 MG/1
TABLET ORAL
Refills: 0 | Status: CANCELLED | OUTPATIENT
Start: 2020-08-12 | End: 2020-08-18

## 2020-08-13 RX ORDER — ALPRAZOLAM 0.5 MG/1
TABLET ORAL
Qty: 90 TABLET | Refills: 0 | Status: SHIPPED | OUTPATIENT
Start: 2020-08-13 | End: 2020-09-12

## 2020-08-13 NOTE — TELEPHONE ENCOUNTER
Pt called back today asking again for a refill  and was advised that this will most Likley not be refilled at this time due to recent script - and that he would need to wait the 30 days (or a few days before to call in for a new script)     Pt  states that he wants his refill for 120 of the xanax - pt states that he is going to go thru withdrawal of have a heart attack- Pt would like to know if you will fill the full supply. Wants a call back with DR ALLEN's response.

## 2020-08-13 NOTE — TELEPHONE ENCOUNTER
Orders Placed This Encounter   Medications    ALPRAZolam (XANAX) 0.5 MG tablet     Sig: take 1 to 2 tablets by mouth three times a day if needed for sleep (THIS IS MEANT TO BE A 30 DAY SUPPLY)     Dispense:  42 tablet     Refill:  0    ALPRAZolam (XANAX) 0.5 MG tablet     Sig: TAKE 1 TO 2 TABLETS BY MOUTH 3 TIMES A DAY AS NEEDED FOR ANXIETY. THIS IS TO SUPPLY PATIENT WITH USUAL 30 DAY SUPPLY OF #120     Dispense:  90 tablet     Refill:  0     Covering physician only called in 7 day supply of his usual prescription. This is meant to allow him his usual monthly amount of 120       The above med(s) were e-scripted to the patient's pharmacy.    Please advise patient  Roselle Cockayne, MD

## 2020-08-18 RX ORDER — TAMSULOSIN HYDROCHLORIDE 0.4 MG/1
0.4 CAPSULE ORAL DAILY
Qty: 30 CAPSULE | Refills: 0 | Status: SHIPPED | OUTPATIENT
Start: 2020-08-18 | End: 2020-12-10 | Stop reason: ALTCHOICE

## 2020-08-31 ENCOUNTER — TELEPHONE (OUTPATIENT)
Dept: FAMILY MEDICINE CLINIC | Age: 60
End: 2020-08-31

## 2020-09-03 ENCOUNTER — OFFICE VISIT (OUTPATIENT)
Dept: FAMILY MEDICINE CLINIC | Age: 60
End: 2020-09-03
Payer: COMMERCIAL

## 2020-09-03 VITALS
DIASTOLIC BLOOD PRESSURE: 72 MMHG | SYSTOLIC BLOOD PRESSURE: 120 MMHG | HEART RATE: 80 BPM | BODY MASS INDEX: 21.42 KG/M2 | WEIGHT: 153 LBS | TEMPERATURE: 97.5 F | OXYGEN SATURATION: 93 % | HEIGHT: 71 IN

## 2020-09-03 DIAGNOSIS — R56.9 CONVULSIONS, UNSPECIFIED CONVULSION TYPE (HCC): ICD-10-CM

## 2020-09-03 DIAGNOSIS — E16.2 HYPOGLYCEMIA: ICD-10-CM

## 2020-09-03 DIAGNOSIS — R41.0 CONFUSION: ICD-10-CM

## 2020-09-03 DIAGNOSIS — R73.9 HYPERGLYCEMIA: ICD-10-CM

## 2020-09-03 DIAGNOSIS — R41.3 MEMORY LOSS: ICD-10-CM

## 2020-09-03 LAB
ALBUMIN SERPL-MCNC: 3.8 G/DL (ref 3.5–4.6)
ALP BLD-CCNC: 86 U/L (ref 35–104)
ALT SERPL-CCNC: <5 U/L (ref 0–41)
ANION GAP SERPL CALCULATED.3IONS-SCNC: 8 MEQ/L (ref 9–15)
AST SERPL-CCNC: 15 U/L (ref 0–40)
BILIRUB SERPL-MCNC: <0.2 MG/DL (ref 0.2–0.7)
BUN BLDV-MCNC: 9 MG/DL (ref 8–23)
C-REACTIVE PROTEIN: 7.6 MG/L (ref 0–5)
CALCIUM SERPL-MCNC: 9.4 MG/DL (ref 8.5–9.9)
CHLORIDE BLD-SCNC: 104 MEQ/L (ref 95–107)
CO2: 29 MEQ/L (ref 20–31)
CREAT SERPL-MCNC: 0.73 MG/DL (ref 0.7–1.2)
GFR AFRICAN AMERICAN: >60
GFR NON-AFRICAN AMERICAN: >60
GLOBULIN: 3.1 G/DL (ref 2.3–3.5)
GLUCOSE BLD-MCNC: 77 MG/DL (ref 70–99)
HBA1C MFR BLD: 4.9 % (ref 4.8–5.9)
HCT VFR BLD CALC: 36.9 % (ref 42–52)
HEMOGLOBIN: 12.7 G/DL (ref 14–18)
INSULIN: 2.6 UIU/ML (ref 2.6–24.9)
MCH RBC QN AUTO: 35.9 PG (ref 27–31.3)
MCHC RBC AUTO-ENTMCNC: 34.3 % (ref 33–37)
MCV RBC AUTO: 104.7 FL (ref 80–100)
PDW BLD-RTO: 12.5 % (ref 11.5–14.5)
PLATELET # BLD: 401 K/UL (ref 130–400)
POTASSIUM SERPL-SCNC: 5.4 MEQ/L (ref 3.4–4.9)
RBC # BLD: 3.53 M/UL (ref 4.7–6.1)
SEDIMENTATION RATE, ERYTHROCYTE: 46 MM (ref 0–20)
SODIUM BLD-SCNC: 141 MEQ/L (ref 135–144)
TOTAL PROTEIN: 6.9 G/DL (ref 6.3–8)
TSH SERPL DL<=0.05 MIU/L-ACNC: 3.02 UIU/ML (ref 0.44–3.86)
WBC # BLD: 4.9 K/UL (ref 4.8–10.8)

## 2020-09-03 PROCEDURE — G8427 DOCREV CUR MEDS BY ELIG CLIN: HCPCS | Performed by: FAMILY MEDICINE

## 2020-09-03 PROCEDURE — 3017F COLORECTAL CA SCREEN DOC REV: CPT | Performed by: FAMILY MEDICINE

## 2020-09-03 PROCEDURE — G8420 CALC BMI NORM PARAMETERS: HCPCS | Performed by: FAMILY MEDICINE

## 2020-09-03 PROCEDURE — 1036F TOBACCO NON-USER: CPT | Performed by: FAMILY MEDICINE

## 2020-09-03 PROCEDURE — 99213 OFFICE O/P EST LOW 20 MIN: CPT | Performed by: FAMILY MEDICINE

## 2020-09-03 NOTE — PROGRESS NOTES
9/3/2020    TELEHEALTH EVALUATION -- Audio/Visual (During GAFYB-90 public health emergency)    Due to COVID 19 outbreak, patient's office visit was converted to a virtual visit. Patient was contacted and agreed to proceed with a virtual visit via Telephone Visit  The risks and benefits of converting to a virtual visit were discussed in light of the current infectious disease epidemic. Patient also understood that insurance coverage and co-pays are up to their individual insurance plans.     HPI:    Johnyshad Ward (:  1960) has requested an audio/video evaluation for the following concern(s):  Chief Complaint   Patient presents with    Other     disorderly, pulled into someone else's drive way, blood sugar low, seziure in sleep, stuttering, forgetfulness, hearing loss,x 6 months     Episode of confusion/disorientation  Was shaking in his sleep yesterday  Experiencing stuttering/forgetfulness/hearing loss    Reports that he takes 4 xanax on most days and sometimes 5    The pharmacy has been auto-filling the prescription, notifying him at 20 days    He does take 3-4 gabapentin/day    Some days doesn't take it at all    States he is trying to wean off of that    Gabapentin is written to be up to 6/day and he does get them filled monthly    Patient Active Problem List   Diagnosis    Generalized pain    High risk medication use    IBS (irritable bowel syndrome)    Anxiety    Kidney disease    Anemia    Diverticulosis    Peptic ulcer disease    CAD (coronary artery disease)    Substance abuse ([address]    Chronic back pain    Chemical dependency ([address]    Cardiomyopathy ([address]    Hyperlipidemia     Past Medical History:   Diagnosis Date    Alcoholic ([address]     20 years ago    Anxiety     Arthritis     Asthma     CAD (coronary artery disease)     Chemical dependency ([address] AUG 2010 PER PT    Chronic back pain     Diverticulosis     Fibromyalgia     Hyperlipidemia     IBS (irritable bowel syndrome) 10/13/2011    IV drug abuse ([facility] [address]     Nephrotic syndrome     remission    Osteoarthritis     Panic attacks     Peptic ulcer disease     Rhabdomyolysis 2013    Substance abuse ([facility]     ULTRAM         Review of Systems  Otherwise physically feels healthy without sob/palpitations/chest pain/f/c/n/v/weight gain/weight loss/abd pain    Prior to Visit Medications    Medication Sig Taking? Authorizing Provider   tamsulosin (FLOMAX) 0.4 MG capsule Take 1 capsule by mouth daily Yes Alejandra Torres MD   desonide (DESOWEN) 0.05 % cream APPLY TO THE AFFECTED AREA TWICE A DAY Yes Rajendra Denton MD   omeprazole (PRILOSEC) 40 MG capsule Take 40 mg by mouth daily. Yes Historical Provider, MD   carvedilol (COREG) 6.25 MG tablet Take 6.25 mg by mouth 2 times daily (with meals). Yes Historical Provider, MD   lisinopril (ZESTRIL) 2.5 MG tablet Take 2.5 mg by mouth daily. Yes Historical Provider, MD   ALPRAZolam (XANAX) 0.5 MG tablet TAKE 1 TO 2 TABLETS BY MOUTH 3 TIMES A DAY AS NEEDED FOR ANXIETY.   THIS IS TO SUPPLY PATIENT WITH USUAL 30 DAY SUPPLY OF #120  Patient not taking: Reported on 9/3/2020  Rajendra Denton MD       Social History     Tobacco Use    Smoking status: Former Smoker     Packs/day: 1.50     Years: 20.00     Pack years: 30.00     Types: Cigarettes     Last attempt to quit: 10/13/1997     Years since quittin.9    Smokeless tobacco: Never Used   Substance Use Topics    Alcohol use: No     Comment: H/O SUBSTANCE ABUSE INCLUDING ALCOHOL    Drug use: No     Comment: CHEMICAL DEPENDENCY/ADDICTION            Physical Exam:  /72 (Site: Right Upper Arm)   Pulse 80   Temp 97.5 °F (36.4 °C)   Ht 5' 11\" (1.803 m)   Wt 153 lb (69.4 kg)   SpO2 93%   BMI 21.34 kg/m²     Gen: Well, NAD, Alert, Oriented x 3   HEENT: EOMI, eyes clear, MMM  Skin: without rash or jaundice  Neck: no significant lymphadenopathy or thyromegaly  Lungs: CTA B w/out Rales/Wheezes/Rhonchi, Good respiratory effort   Heart: RRR, S1S2, w/out M/R/G, non-displaced PMI     Ext: No C/C/E Bilaterally. Neuro: Neurovascularly intact w/ Sensory/Motor intact UE/LE Bilaterally. Psych: euthymic        Due to this being a TeleHealth encounter, evaluation of the following organ systems is limited: Vitals/Constitutional/EENT/Resp/CV/GI//MS/Neuro/Skin/Heme-Lymph-Imm. Lab Results   Component Value Date    WBC 4.4 (L) 02/20/2019    HGB 14.7 02/20/2019    HCT 43.7 02/20/2019     02/20/2019    CHOL 221 (H) 02/20/2019    TRIG 73 02/20/2019     (H) 02/20/2019    ALT 8 02/20/2019    AST 16 02/20/2019     02/20/2019    K 4.1 02/20/2019    CL 98 02/20/2019    CREATININE 0.75 02/20/2019    BUN 15 02/20/2019    CO2 27 02/20/2019    TSH 3.810 02/20/2019    PSA 2.40 06/06/2017    INR 1.2 07/17/2013         ASSESSMENT/PLAN:     Diagnosis Orders   1. Confusion  CT HEAD WO CONTRAST    C-Reactive Protein    Sedimentation Rate    TSH without Reflex    Comprehensive Metabolic Panel    CBC    MIGUEL Torre MD, Neurology, [place]    Pain Management Drug Screen   2. Convulsions, unspecified convulsion type ([address]  CT HEAD WO CONTRAST    C-Reactive Protein    Sedimentation Rate    TSH without Reflex    Comprehensive Metabolic Panel    CBC    MIGUEL Torre MD, Neurology, [place]   3. Memory loss  CT HEAD WO CONTRAST    C-Reactive Protein    Sedimentation Rate    TSH without Reflex    Comprehensive Metabolic Panel    CBC    MIGUEL Torre MD, Neurology, [place]   4. Long term prescription benzodiazepine use  CT HEAD WO CONTRAST    MIGUEL Torre MD, Neurology, [place]   5. Stuttering  Bonita Cordova MD, Neurology, [place]   6. Bilateral hearing loss, unspecified hearing loss type  MIGUEL Torre MD, Neurology, [place]   7. Hyperglycemia  Hemoglobin A1C   8. Hypoglycemia  Hemoglobin A1C    Insulin, Total         No follow-ups on file.     Would like him to cut down on the xanax  No more than 4/day at most 5      This is a total of 2-2.5mg/day, which is reasonable, but long-term    Polypharmacy certainly may play a role    Drug screen and labs    CT brain    Neuro referral    An  electronic signature was used to authenticate this note. --Maame Joshua MD on 9/3/2020 at 2:57 PM        Pursuant to the emergency declaration under the [facility], [facility] waiver authority and the [employer] and [employer] Act, this Virtual  Visit was conducted, with patient's consent, to reduce the patient's risk of exposure to COVID-19 and provide continuity of care for an established patient.      11 minute call

## 2020-09-06 LAB
6-ACETYLMORPHINE: NOT DETECTED
7-AMINOCLONAZEPAM: NOT DETECTED
ALPHA-OH-ALPRAZOLAM: PRESENT
ALPRAZOLAM: PRESENT
AMPHETAMINE: NOT DETECTED
BARBITURATES: NOT DETECTED
BENZOYLECGONINE: NOT DETECTED
BUPRENORPHINE: NOT DETECTED
CARISOPRODOL: NOT DETECTED
CLONAZEPAM: NOT DETECTED
CODEINE: NOT DETECTED
CREATININE URINE: 306.1 MG/DL (ref 20–400)
DIAZEPAM: NOT DETECTED
EER PAIN MGT DRUG PANEL, HIGH RES/EMIT U: NORMAL
ETHYL GLUCURONIDE: NORMAL
FENTANYL: NOT DETECTED
HYDROCODONE: NOT DETECTED
HYDROMORPHONE: NOT DETECTED
LORAZEPAM: NOT DETECTED
MARIJUANA METABOLITE: NOT DETECTED
MDA: NOT DETECTED
MDEA: NOT DETECTED
MDMA URINE: NOT DETECTED
MEPERIDINE: NOT DETECTED
METHADONE: NOT DETECTED
METHAMPHETAMINE: NOT DETECTED
METHYLPHENIDATE: NOT DETECTED
MIDAZOLAM: NOT DETECTED
MORPHINE: NOT DETECTED
NORBUPRENORPHINE, FREE: NOT DETECTED
NORDIAZEPAM: NOT DETECTED
NORFENTANYL: NOT DETECTED
NORHYDROCODONE, URINE: NOT DETECTED
NOROXYCODONE: NOT DETECTED
NOROXYMORPHONE, URINE: NOT DETECTED
OXAZEPAM: NOT DETECTED
OXYCODONE: NOT DETECTED
OXYMORPHONE: NOT DETECTED
PAIN MANAGEMENT DRUG PANEL: NORMAL
PCP: NOT DETECTED
PHENTERMINE: NOT DETECTED
PROPOXYPHENE: NOT DETECTED
TAPENTADOL, URINE: NOT DETECTED
TAPENTADOL-O-SULFATE, URINE: NOT DETECTED
TEMAZEPAM: NOT DETECTED
TRAMADOL: NOT DETECTED
ZOLPIDEM: NOT DETECTED

## 2020-09-15 ENCOUNTER — HOSPITAL ENCOUNTER (OUTPATIENT)
Dept: CT IMAGING | Age: 60
Discharge: HOME | End: 2020-09-17
Payer: COMMERCIAL

## 2020-09-15 PROCEDURE — 70450 CT HEAD/BRAIN W/O DYE: CPT

## 2020-09-18 ENCOUNTER — TELEPHONE (OUTPATIENT)
Dept: FAMILY MEDICINE CLINIC | Age: 60
End: 2020-09-18

## 2020-09-18 NOTE — TELEPHONE ENCOUNTER
Spoke to pt about scheduling his next appt. Pt states that he will have to consult with his wife and call us back to [facility].

## 2020-10-13 ENCOUNTER — OFFICE VISIT (OUTPATIENT)
Dept: UROLOGY | Age: 60
End: 2020-10-13
Payer: COMMERCIAL

## 2020-10-13 VITALS
BODY MASS INDEX: 21.7 KG/M2 | DIASTOLIC BLOOD PRESSURE: 76 MMHG | HEART RATE: 87 BPM | WEIGHT: 155 LBS | SYSTOLIC BLOOD PRESSURE: 112 MMHG | HEIGHT: 71 IN

## 2020-10-13 PROCEDURE — G8483 FLU IMM NO ADMIN DOC REA: HCPCS | Performed by: UROLOGY

## 2020-10-13 PROCEDURE — 99213 OFFICE O/P EST LOW 20 MIN: CPT | Performed by: UROLOGY

## 2020-10-13 PROCEDURE — 51741 ELECTRO-UROFLOWMETRY FIRST: CPT | Performed by: UROLOGY

## 2020-10-13 PROCEDURE — G8427 DOCREV CUR MEDS BY ELIG CLIN: HCPCS | Performed by: UROLOGY

## 2020-10-13 PROCEDURE — 1036F TOBACCO NON-USER: CPT | Performed by: UROLOGY

## 2020-10-13 PROCEDURE — 3017F COLORECTAL CA SCREEN DOC REV: CPT | Performed by: UROLOGY

## 2020-10-13 PROCEDURE — 51798 US URINE CAPACITY MEASURE: CPT | Performed by: UROLOGY

## 2020-10-13 PROCEDURE — G8420 CALC BMI NORM PARAMETERS: HCPCS | Performed by: UROLOGY

## 2020-10-13 RX ORDER — PANTOPRAZOLE SODIUM 40 MG/1
TABLET, DELAYED RELEASE ORAL
COMMUNITY
Start: 2020-10-05

## 2020-10-13 RX ORDER — DICYCLOMINE HYDROCHLORIDE 10 MG/1
CAPSULE ORAL
COMMUNITY
Start: 2020-09-25 | End: 2021-11-04 | Stop reason: ALTCHOICE

## 2020-10-13 RX ORDER — TAMSULOSIN HYDROCHLORIDE 0.4 MG/1
0.4 CAPSULE ORAL DAILY
Qty: 90 CAPSULE | Refills: 3 | Status: SHIPPED | OUTPATIENT
Start: 2020-10-13 | End: 2021-09-27

## 2020-10-13 RX ORDER — GABAPENTIN 600 MG/1
TABLET ORAL
COMMUNITY
Start: 2020-10-11 | End: 2021-01-07

## 2020-10-13 ASSESSMENT — ENCOUNTER SYMPTOMS
ABDOMINAL DISTENTION: 0
SHORTNESS OF BREATH: 0
ABDOMINAL PAIN: 0

## 2020-10-13 NOTE — PROGRESS NOTES
Subjective:      Patient ID: Lynette Lisa is a 56 y.o. male. HPI This is a 55 yo white male with h/o IBS, Anxiety, Anemia, GERD, DJD/Scoliosis, CAD/Cardiomyopathy, Pacemaker/Arrythmia, HTN, BPH w/LUTs on Flomax and back in follow-up. Since last seen on 2/5/16, he has no new complaints. He does get hesitancy and some pain if he misses his daily Flomax. He has no hematuria or UTI's. He gets some mild but stable PVD. He has no frequency or urgecny or UI. He has a grandfather with a h/o prostate cancer diagnosed while he was in his 66's. He has no new medical or surgical problems. Past Medical History:   Diagnosis Date    Alcoholic ([address]     20 years ago    Anxiety     Arthritis     Asthma     CAD (coronary artery disease)     Chemical dependency ([facility] AUG 2010 PER PT    Chronic back pain     Diverticulosis     Fibromyalgia     Hyperlipidemia     IBS (irritable bowel syndrome) 10/13/2011    IV drug abuse ([facility]     Nephrotic syndrome     remission    Osteoarthritis     Panic attacks     Peptic ulcer disease     Rhabdomyolysis 7/2013    Substance abuse ([facility]     ULTRAM     Past Surgical History:   Procedure Laterality Date    APPENDECTOMY      CARDIAC CATHETERIZATION      COLONOSCOPY  07/17/2017    Gianni Tyler MD    UPPER GASTROINTESTINAL ENDOSCOPY  07/17/2017    [facility] Mike Rodgers MD     UPPER GASTROINTESTINAL ENDOSCOPY  10/09/2017    DR. DESIR     Social History     Socioeconomic History    Marital status:      Spouse name: None    Number of children: None    Years of education: None    Highest education level: None   Occupational History    None   Social Needs    Financial resource strain: None    Food insecurity     Worry: None     Inability: None    Transportation needs     Medical: None     Non-medical: None   Tobacco Use    Smoking status: Former Smoker     Packs/day: 1.50     Years: 20.00     Pack years: 30.00     Types: Cigarettes     Last attempt to quit: 10/13/1997 Years since quittin.0    Smokeless tobacco: Never Used   Substance and Sexual Activity    Alcohol use: No     Comment: H/O SUBSTANCE ABUSE INCLUDING ALCOHOL    Drug use: No     Comment: CHEMICAL DEPENDENCY/ADDICTION    Sexual activity: Not Currently   Lifestyle    Physical activity     Days per week: None     Minutes per session: None    Stress: None   Relationships    Social connections     Talks on phone: None     Gets together: None     Attends [employer] service: None     Active member of club or organization: None     Attends meetings of clubs or organizations: None     Relationship status: None    Intimate partner violence     Fear of current or ex partner: None     Emotionally abused: None     Physically abused: None     Forced sexual activity: None   Other Topics Concern    None   Social History Narrative    None     Family History   Problem Relation Age of Onset    Diabetes Mother     Early Death Father     Diabetes Other      Current Outpatient Medications   Medication Sig Dispense Refill    gabapentin (NEURONTIN) 600 MG tablet       pantoprazole (PROTONIX) 40 MG tablet take 1 tablet by mouth once daily      dicyclomine (BENTYL) 10 MG capsule take 1 capsule by mouth before meals and at bedtime      ALPRAZolam (XANAX) 0.5 MG tablet 1-2 tabs PO up to three times daily. Intended supply 30 days 120 tablet 0    tamsulosin (FLOMAX) 0.4 MG capsule Take 1 capsule by mouth daily 30 capsule 0    desonide (DESOWEN) 0.05 % cream APPLY TO THE AFFECTED AREA TWICE A DAY 15 g 3    carvedilol (COREG) 6.25 MG tablet Take 6.25 mg by mouth 2 times daily (with meals).  lisinopril (ZESTRIL) 2.5 MG tablet Take 2.5 mg by mouth daily. No current facility-administered medications for this visit. Cymbalta [duloxetine hcl]  reviewed      Review of Systems   Constitutional: Negative for unexpected weight change. Respiratory: Negative for shortness of breath.     Cardiovascular: Negative for vol 111 cc  post void residual by U/S: 27 cc    Assessment: This is a 55 yo white male with h/o IBS, Anxiety, Anemia, GERD, DJD/Scoliosis, CAD/Cardiomyopathy, HTN,  Pacemaker, HTN, BPH w/LUTs on Flomax and his LUTs are stable. He has no new complaints and has not had a PSA since 2017 and I recommend a PSA and if normal then yearly follow-up. He will continue on yearly follow-up. Plan:      1. PSA today and will call for results  2. F/U 1 yr for PSA  3.    Orders Placed This Encounter   Medications    tamsulosin (FLOMAX) 0.4 MG capsule     Sig: Take 1 capsule by mouth daily     Dispense:  90 capsule     Refill:  3             Angela Rodriguez MD

## 2020-10-15 RX ORDER — ALPRAZOLAM 0.5 MG/1
TABLET ORAL
Qty: 120 TABLET | Refills: 0 | Status: SHIPPED | OUTPATIENT
Start: 2020-10-15 | End: 2020-11-13

## 2020-10-15 NOTE — TELEPHONE ENCOUNTER
Pt calling checking up on refill update, requesting 90 day supply I advised him with this type of medication may not be able to do so

## 2020-11-13 RX ORDER — ALPRAZOLAM 0.5 MG/1
TABLET ORAL
Qty: 120 TABLET | Refills: 0 | Status: SHIPPED | OUTPATIENT
Start: 2020-11-13 | End: 2020-12-10 | Stop reason: SDUPTHER

## 2020-12-10 ENCOUNTER — VIRTUAL VISIT (OUTPATIENT)
Dept: FAMILY MEDICINE CLINIC | Age: 60
End: 2020-12-10
Payer: COMMERCIAL

## 2020-12-10 PROCEDURE — 99442 PR PHYS/QHP TELEPHONE EVALUATION 11-20 MIN: CPT | Performed by: FAMILY MEDICINE

## 2020-12-10 RX ORDER — CARVEDILOL 12.5 MG/1
TABLET ORAL
COMMUNITY
Start: 2020-11-05

## 2020-12-10 RX ORDER — ALPRAZOLAM 0.5 MG/1
TABLET ORAL
Qty: 120 TABLET | Refills: 0 | Status: SHIPPED | OUTPATIENT
Start: 2020-12-10 | End: 2021-01-07

## 2020-12-10 NOTE — PROGRESS NOTES
12/10/2020    TELEHEALTH EVALUATION -- Audio/Visual (During QCTSN-08 public health emergency)    Due to COVID 19 outbreak, patient's office visit was converted to a virtual visit. Patient was contacted and agreed to proceed with a virtual visit via Telephone Visit  The risks and benefits of converting to a virtual visit were discussed in light of the current infectious disease epidemic. Patient also understood that insurance coverage and co-pays are up to their individual insurance plans.     HPI:    Leana Dean (:  1960) has requested an audio/video evaluation for the following concern(s):  Chief Complaint   Patient presents with    Medication Refill     Needed apt for Xanax refill      Episode of confusion/disorientation in past  Has had f/u with neurology  Note reviewed    He reports that he has been better with more consistent diet and neurology had encouraged less xanax use    Reports that he takes 4 xanax on most days     Fills have been more consistently about every 30 days now    He does take 3-4 gabapentin/day        Planning to get flu shot      Patient Active Problem List   Diagnosis    Generalized pain    High risk medication use    IBS (irritable bowel syndrome)    Anxiety    Kidney disease    Anemia    Diverticulosis    Peptic ulcer disease    CAD (coronary artery disease)    Substance abuse ([address]    Chronic back pain    Chemical dependency ([address]    Cardiomyopathy ([address]    Hyperlipidemia     Past Medical History:   Diagnosis Date    Alcoholic ([address]     20 years ago    Anxiety     Arthritis     Asthma     CAD (coronary artery disease)     Chemical dependency ([address] AUG 2010 PER PT    Chronic back pain     Diverticulosis     Fibromyalgia     Hyperlipidemia     IBS (irritable bowel syndrome) 10/13/2011    IV drug abuse ([address]     Nephrotic syndrome     remission    Osteoarthritis     Panic attacks     Peptic ulcer disease     Rhabdomyolysis 2013    Substance abuse ([facility]     ULTRAM         Review of Systems  Otherwise physically feels healthy without sob/palpitations/chest pain/f/c/n/v/weight gain/weight loss/abd pain    Prior to Visit Medications    Medication Sig Taking? Authorizing Provider   carvedilol (COREG) 12.5 MG tablet TAKE 1 TABLET BY MOUTH TWICE DAILY. Yes Historical Provider, MD   ALPRAZolam (XANAX) 0.5 MG tablet TAKE 1 TO 2 TABLETS BY MOUTH UP TO 3 TIMES DAILY. INTENDED SUPPLY: 30 DAYS. Yes Kimberley Clark MD   gabapentin (NEURONTIN) 600 MG tablet  Yes Historical Provider, MD   pantoprazole (PROTONIX) 40 MG tablet take 1 tablet by mouth once daily Yes Historical Provider, MD   dicyclomine (BENTYL) 10 MG capsule take 1 capsule by mouth before meals and at bedtime Yes Historical Provider, MD   tamsulosin (FLOMAX) 0.4 MG capsule Take 1 capsule by mouth daily Yes Beba Garza MD   desonide (DESOWEN) 0.05 % cream APPLY TO THE AFFECTED AREA TWICE A DAY Yes Kimberley Clark MD   lisinopril (ZESTRIL) 2.5 MG tablet Take 2.5 mg by mouth daily. Yes Historical Provider, MD       Social History     Tobacco Use    Smoking status: Former Smoker     Packs/day: 1.50     Years: 20.00     Pack years: 30.00     Types: Cigarettes     Last attempt to quit: 10/13/1997     Years since quittin.1    Smokeless tobacco: Never Used   Substance Use Topics    Alcohol use: No     Comment: H/O SUBSTANCE ABUSE INCLUDING ALCOHOL    Drug use: No     Comment: CHEMICAL DEPENDENCY/ADDICTION            Physical Exam:  Patient-Reported Vitals 12/10/2020   Patient-Reported Weight 150 lb   Patient-Reported Height 5' 11\"        Gen: Well, NAD, Alert, Oriented x 3     Psych: euthymic        Due to this being a TeleHealth encounter, evaluation of the following organ systems is limited: Vitals/Constitutional/EENT/Resp/CV/GI//MS/Neuro/Skin/Heme-Lymph-Imm.     Lab Results   Component Value Date    WBC 4.9 2020    HGB 12.7 (L) 2020    HCT 36.9 (L) 2020     (H) 2020

## 2020-12-23 ENCOUNTER — TELEPHONE (OUTPATIENT)
Dept: FAMILY MEDICINE CLINIC | Age: 60
End: 2020-12-23

## 2021-01-06 DIAGNOSIS — F41.9 ANXIETY: ICD-10-CM

## 2021-01-07 RX ORDER — GABAPENTIN 600 MG/1
TABLET ORAL
Qty: 180 TABLET | Refills: 2 | Status: SHIPPED | OUTPATIENT
Start: 2021-01-07 | End: 2021-04-05

## 2021-01-07 RX ORDER — ALPRAZOLAM 0.5 MG/1
TABLET ORAL
Qty: 120 TABLET | Refills: 0 | Status: SHIPPED | OUTPATIENT
Start: 2021-01-07 | End: 2021-02-08

## 2021-01-07 NOTE — TELEPHONE ENCOUNTER
Patient called to check on status of request.   Made aware medications are still pending, provider not in office yesterday, and that medication requests can take up to 72 hours.

## 2021-02-08 DIAGNOSIS — F41.9 ANXIETY: ICD-10-CM

## 2021-02-08 RX ORDER — ALPRAZOLAM 0.5 MG/1
TABLET ORAL
Qty: 120 TABLET | Refills: 0 | Status: SHIPPED | OUTPATIENT
Start: 2021-02-08 | End: 2021-03-09

## 2021-03-09 DIAGNOSIS — F41.9 ANXIETY: ICD-10-CM

## 2021-03-09 RX ORDER — ALPRAZOLAM 0.5 MG/1
TABLET ORAL
Qty: 120 TABLET | Refills: 0 | Status: SHIPPED | OUTPATIENT
Start: 2021-03-09 | End: 2021-04-07

## 2021-03-10 ENCOUNTER — VIRTUAL VISIT (OUTPATIENT)
Dept: FAMILY MEDICINE CLINIC | Age: 61
End: 2021-03-10
Payer: COMMERCIAL

## 2021-03-10 DIAGNOSIS — I25.119 CORONARY ARTERY DISEASE INVOLVING NATIVE HEART WITH ANGINA PECTORIS, UNSPECIFIED VESSEL OR LESION TYPE (HCC): ICD-10-CM

## 2021-03-10 DIAGNOSIS — F19.20 CHEMICAL DEPENDENCY (HCC): ICD-10-CM

## 2021-03-10 DIAGNOSIS — R56.9 CONVULSIONS, UNSPECIFIED CONVULSION TYPE (HCC): ICD-10-CM

## 2021-03-10 DIAGNOSIS — F41.9 ANXIETY: ICD-10-CM

## 2021-03-10 DIAGNOSIS — I47.20 VENTRICULAR TACHYCARDIA: ICD-10-CM

## 2021-03-10 DIAGNOSIS — I42.9 CARDIOMYOPATHY, UNSPECIFIED TYPE (HCC): ICD-10-CM

## 2021-03-10 PROCEDURE — 99213 OFFICE O/P EST LOW 20 MIN: CPT | Performed by: FAMILY MEDICINE

## 2021-03-10 RX ORDER — ALPRAZOLAM 0.5 MG/1
TABLET ORAL
Qty: 120 TABLET | Refills: 0 | Status: CANCELLED | OUTPATIENT
Start: 2021-03-10 | End: 2021-04-10

## 2021-03-10 ASSESSMENT — PATIENT HEALTH QUESTIONNAIRE - PHQ9
SUM OF ALL RESPONSES TO PHQ9 QUESTIONS 1 & 2: 0
SUM OF ALL RESPONSES TO PHQ QUESTIONS 1-9: 0
SUM OF ALL RESPONSES TO PHQ QUESTIONS 1-9: 0

## 2021-03-10 NOTE — PROGRESS NOTES
3/10/2021    TELEHEALTH EVALUATION -- Audio/Visual (During GCJGL-54 public health emergency)    Due to Iron 19 outbreak, patient's office visit was converted to a virtual visit. Patient was contacted and agreed to proceed with a virtual visit via Telephone Visit  The risks and benefits of converting to a virtual visit were discussed in light of the current infectious disease epidemic. Patient also understood that insurance coverage and co-pays are up to their individual insurance plans.     HPI:    Douglas Baird (:  1960) has requested an audio/video evaluation for the following concern(s):  Chief Complaint   Patient presents with    Medication Refill     Follow up, medication review/refill    Long-term xanax 120/month    He is signed up for a covid shot      Patient Active Problem List   Diagnosis    Generalized pain    High risk medication use    IBS (irritable bowel syndrome)    Anxiety    Kidney disease    Anemia    Diverticulosis    Peptic ulcer disease    Coronary artery disease involving native heart with angina pectoris ([address]    Substance abuse ([address]    Chronic back pain    Chemical dependency ([address]    Cardiomyopathy ([address]    Hyperlipidemia    Ventricular tachycardia ([address]    Convulsions ([address]     Past Medical History:   Diagnosis Date    Alcoholic ([address]     20 years ago    Anxiety     Arthritis     Asthma     CAD (coronary artery disease)     Chemical dependency ([address] AUG 2010 PER PT    Chronic back pain     Convulsions ([address] 3/10/2021    Diverticulosis     Fibromyalgia     Hyperlipidemia     IBS (irritable bowel syndrome) 10/13/2011    IV drug abuse ([address]     Nephrotic syndrome     remission    Osteoarthritis     Panic attacks     Peptic ulcer disease     Rhabdomyolysis 2013    Substance abuse ([address]     ULTRAM         Review of Systems  Otherwise physically feels healthy without sob/palpitations/chest pain/f/c/n/v/weight gain/weight loss/abd pain    Prior to Visit Medications    Medication Sig Taking? Authorizing Provider   ALPRAZolam (XANAX) 0.5 MG tablet TAKE 1 TO 2 TABLETS BY MOUTH UP TO 3 TIMES DAILY. INTENDED SUPPLY: 30 DAYS. Yes Nigel Rea MD   gabapentin (NEURONTIN) 600 MG tablet take 1 tablet by mouth six times a day Yes Nigel Rea MD   carvedilol (COREG) 12.5 MG tablet TAKE 1 TABLET BY MOUTH TWICE DAILY. Yes Historical Provider, MD   pantoprazole (PROTONIX) 40 MG tablet take 1 tablet by mouth once daily Yes Historical Provider, MD   dicyclomine (BENTYL) 10 MG capsule take 1 capsule by mouth before meals and at bedtime Yes Historical Provider, MD   tamsulosin (FLOMAX) 0.4 MG capsule Take 1 capsule by mouth daily Yes Elena Cuellar MD   desonide (DESOWEN) 0.05 % cream APPLY TO THE AFFECTED AREA TWICE A DAY Yes Nigel Rea MD   lisinopril (ZESTRIL) 2.5 MG tablet Take 2.5 mg by mouth daily. Yes Historical Provider, MD       Social History     Tobacco Use    Smoking status: Former Smoker     Packs/day: 1.50     Years: 20.00     Pack years: 30.00     Types: Cigarettes     Quit date: 10/13/1997     Years since quittin.4    Smokeless tobacco: Never Used   Substance Use Topics    Alcohol use: No     Comment: H/O SUBSTANCE ABUSE INCLUDING ALCOHOL    Drug use: No     Comment: CHEMICAL DEPENDENCY/ADDICTION            Physical Exam:  Patient-Reported Vitals 3/10/2021   Patient-Reported Weight 160 lb   Patient-Reported Height 5'11        Gen: Well, NAD, Alert, Oriented x 3   Conversant/pleasant/appropriate  Psych: euthymic        Due to this being a TeleHealth encounter, evaluation of the following organ systems is limited: Vitals/Constitutional/EENT/Resp/CV/GI//MS/Neuro/Skin/Heme-Lymph-Imm.     Lab Results   Component Value Date    WBC 4.9 2020    HGB 12.7 (L) 2020    HCT 36.9 (L) 2020     (H) 2020    CHOL 221 (H) 2019    TRIG 73 2019     (H) 2019    ALT <5 2020    AST 15 2020     09/03/2020    K 5.4 (H) 09/03/2020     09/03/2020    CREATININE 0.73 09/03/2020    BUN 9 09/03/2020    CO2 29 09/03/2020    TSH 3.020 09/03/2020    PSA 2.40 06/06/2017    INR 1.2 07/17/2013    LABA1C 4.9 09/03/2020         ASSESSMENT/PLAN:     Diagnosis Orders   1. Anxiety     2. Cardiomyopathy, unspecified type ([facility]     3. Chemical dependency ([facility]     4. Ventricular tachycardia ([address]     5. Convulsions, unspecified convulsion type ([address]     6. Coronary artery disease involving native heart with angina pectoris, unspecified vessel or lesion type ([address]       Refills were given yesterday    Will get covid shot       No follow-ups on file. No more than 3-4 xanax/day    Controlled Substance Monitoring:    Acute and Chronic Pain Monitoring:   RX Monitoring 12/10/2020   Attestation -   Periodic Controlled Substance Monitoring Possible medication side effects, risk of tolerance/dependence & alternative treatments discussed. ;No signs of potential drug abuse or diversion identified. This is a total of 2-2.5mg/day, which is reasonable  Polypharmacy risk      Neuro referral    An  electronic signature was used to authenticate this note. --Ashly Rivera MD on 3/10/2021 at 1:12 PM        Pursuant to the emergency declaration under the [facility], [address] [place] [address] and the [employer] Act, this Virtual  Visit was conducted, with patient's consent, to reduce the patient's risk of exposure to COVID-19 and provide continuity of care for an established patient.      11 minute call

## 2021-03-26 ENCOUNTER — TELEPHONE (OUTPATIENT)
Dept: FAMILY MEDICINE CLINIC | Age: 61
End: 2021-03-26

## 2021-04-05 RX ORDER — GABAPENTIN 600 MG/1
TABLET ORAL
Qty: 180 TABLET | Refills: 2 | Status: SHIPPED | OUTPATIENT
Start: 2021-04-05 | End: 2021-08-17

## 2021-04-06 ENCOUNTER — TELEPHONE (OUTPATIENT)
Dept: FAMILY MEDICINE CLINIC | Age: 61
End: 2021-04-06

## 2021-04-06 DIAGNOSIS — F41.9 ANXIETY: ICD-10-CM

## 2021-04-07 RX ORDER — ALPRAZOLAM 0.5 MG/1
TABLET ORAL
Qty: 120 TABLET | Refills: 0 | Status: SHIPPED | OUTPATIENT
Start: 2021-04-07 | End: 2021-05-06

## 2021-04-08 NOTE — TELEPHONE ENCOUNTER
Patient calling, states he was told by ins that medication cannot be given until 4/17/2021    Patient states he will be out of medication before than. Patient concerned about this. Wanted to ask if he can pay for medication himself, or if office can okay him receiving early. Or if a dif dosage can be sent to pharmacy for a few days. Please advise.      Ph. [phone]

## 2021-05-05 DIAGNOSIS — F41.9 ANXIETY: ICD-10-CM

## 2021-05-06 RX ORDER — ALPRAZOLAM 0.5 MG/1
TABLET ORAL
Qty: 120 TABLET | Refills: 0 | Status: SHIPPED | OUTPATIENT
Start: 2021-05-06 | End: 2021-06-03

## 2021-06-02 DIAGNOSIS — F41.9 ANXIETY: ICD-10-CM

## 2021-06-03 RX ORDER — ALPRAZOLAM 0.5 MG/1
TABLET ORAL
Qty: 120 TABLET | Refills: 0 | Status: SHIPPED | OUTPATIENT
Start: 2021-06-03 | End: 2021-07-02

## 2021-06-22 ENCOUNTER — OFFICE VISIT (OUTPATIENT)
Dept: FAMILY MEDICINE CLINIC | Age: 61
End: 2021-06-22
Payer: COMMERCIAL

## 2021-06-22 VITALS
HEIGHT: 71 IN | DIASTOLIC BLOOD PRESSURE: 76 MMHG | OXYGEN SATURATION: 98 % | SYSTOLIC BLOOD PRESSURE: 110 MMHG | WEIGHT: 173.8 LBS | TEMPERATURE: 97 F | HEART RATE: 88 BPM | BODY MASS INDEX: 24.33 KG/M2

## 2021-06-22 DIAGNOSIS — G89.29 CHRONIC LOW BACK PAIN, UNSPECIFIED BACK PAIN LATERALITY, UNSPECIFIED WHETHER SCIATICA PRESENT: ICD-10-CM

## 2021-06-22 DIAGNOSIS — I42.9 CARDIOMYOPATHY, UNSPECIFIED TYPE (HCC): ICD-10-CM

## 2021-06-22 DIAGNOSIS — M54.50 CHRONIC LOW BACK PAIN, UNSPECIFIED BACK PAIN LATERALITY, UNSPECIFIED WHETHER SCIATICA PRESENT: ICD-10-CM

## 2021-06-22 DIAGNOSIS — I25.119 CORONARY ARTERY DISEASE INVOLVING NATIVE HEART WITH ANGINA PECTORIS, UNSPECIFIED VESSEL OR LESION TYPE (HCC): ICD-10-CM

## 2021-06-22 DIAGNOSIS — I47.20 VENTRICULAR TACHYCARDIA: ICD-10-CM

## 2021-06-22 DIAGNOSIS — R41.3 MEMORY LOSS: ICD-10-CM

## 2021-06-22 DIAGNOSIS — H91.93 BILATERAL HEARING LOSS, UNSPECIFIED HEARING LOSS TYPE: ICD-10-CM

## 2021-06-22 DIAGNOSIS — Z79.899 LONG TERM PRESCRIPTION BENZODIAZEPINE USE: ICD-10-CM

## 2021-06-22 DIAGNOSIS — Z12.5 SCREENING PSA (PROSTATE SPECIFIC ANTIGEN): ICD-10-CM

## 2021-06-22 DIAGNOSIS — F41.9 ANXIETY: ICD-10-CM

## 2021-06-22 DIAGNOSIS — F41.9 ANXIETY: Primary | ICD-10-CM

## 2021-06-22 LAB
ALBUMIN SERPL-MCNC: 4 G/DL (ref 3.5–4.6)
ALP BLD-CCNC: 112 U/L (ref 35–104)
ALT SERPL-CCNC: 47 U/L (ref 0–41)
ANION GAP SERPL CALCULATED.3IONS-SCNC: 11 MEQ/L (ref 9–15)
AST SERPL-CCNC: 51 U/L (ref 0–40)
BILIRUB SERPL-MCNC: 0.7 MG/DL (ref 0.2–0.7)
BUN BLDV-MCNC: 12 MG/DL (ref 8–23)
CALCIUM SERPL-MCNC: 9.4 MG/DL (ref 8.5–9.9)
CHLORIDE BLD-SCNC: 103 MEQ/L (ref 95–107)
CHOLESTEROL, TOTAL: 219 MG/DL (ref 0–199)
CO2: 29 MEQ/L (ref 20–31)
CREAT SERPL-MCNC: 0.87 MG/DL (ref 0.7–1.2)
GFR AFRICAN AMERICAN: >60
GFR NON-AFRICAN AMERICAN: >60
GLOBULIN: 2.9 G/DL (ref 2.3–3.5)
GLUCOSE BLD-MCNC: 77 MG/DL (ref 70–99)
HCT VFR BLD CALC: 42.9 % (ref 42–52)
HDLC SERPL-MCNC: 141 MG/DL (ref 40–59)
HEMOGLOBIN: 14.3 G/DL (ref 14–18)
LDL CHOLESTEROL CALCULATED: 66 MG/DL (ref 0–129)
MCH RBC QN AUTO: 34.6 PG (ref 27–31.3)
MCHC RBC AUTO-ENTMCNC: 33.3 % (ref 33–37)
MCV RBC AUTO: 104 FL (ref 80–100)
PDW BLD-RTO: 13.5 % (ref 11.5–14.5)
PLATELET # BLD: 225 K/UL (ref 130–400)
POTASSIUM SERPL-SCNC: 4.6 MEQ/L (ref 3.4–4.9)
PROSTATE SPECIFIC ANTIGEN: 2.59 NG/ML (ref 0–5.4)
RBC # BLD: 4.13 M/UL (ref 4.7–6.1)
SODIUM BLD-SCNC: 143 MEQ/L (ref 135–144)
TOTAL PROTEIN: 6.9 G/DL (ref 6.3–8)
TRIGL SERPL-MCNC: 60 MG/DL (ref 0–150)
TSH SERPL DL<=0.05 MIU/L-ACNC: 3.45 UIU/ML (ref 0.44–3.86)
WBC # BLD: 3.4 K/UL (ref 4.8–10.8)

## 2021-06-22 PROCEDURE — G8427 DOCREV CUR MEDS BY ELIG CLIN: HCPCS | Performed by: FAMILY MEDICINE

## 2021-06-22 PROCEDURE — 1036F TOBACCO NON-USER: CPT | Performed by: FAMILY MEDICINE

## 2021-06-22 PROCEDURE — 3017F COLORECTAL CA SCREEN DOC REV: CPT | Performed by: FAMILY MEDICINE

## 2021-06-22 PROCEDURE — 99214 OFFICE O/P EST MOD 30 MIN: CPT | Performed by: FAMILY MEDICINE

## 2021-06-22 PROCEDURE — G8420 CALC BMI NORM PARAMETERS: HCPCS | Performed by: FAMILY MEDICINE

## 2021-06-22 SDOH — ECONOMIC STABILITY: FOOD INSECURITY: WITHIN THE PAST 12 MONTHS, THE FOOD YOU BOUGHT JUST DIDN'T LAST AND YOU DIDN'T HAVE MONEY TO GET MORE.: NEVER TRUE

## 2021-06-22 SDOH — ECONOMIC STABILITY: FOOD INSECURITY: WITHIN THE PAST 12 MONTHS, YOU WORRIED THAT YOUR FOOD WOULD RUN OUT BEFORE YOU GOT MONEY TO BUY MORE.: NEVER TRUE

## 2021-06-22 ASSESSMENT — SOCIAL DETERMINANTS OF HEALTH (SDOH): HOW HARD IS IT FOR YOU TO PAY FOR THE VERY BASICS LIKE FOOD, HOUSING, MEDICAL CARE, AND HEATING?: NOT HARD AT ALL

## 2021-06-22 NOTE — PROGRESS NOTES
Chief Complaint   Patient presents with    Hyperlipidemia    Anxiety     x 3 month check up        HPI:  Vivek Benedict is a 59 y.o. male    Checkup/review chronic issues    Has had covid vaccine     He has a pacemaker  Nonischemic cardiomyopathy  Follows with cardiology  Dr. Byron Ha  Pacemaker checks   Has had virtual visits      Hx substance abuse  Was taken off all controlled substances at one time  Xanax was restarted   Takes 4 xanax/day chronically     Controlled Substances Monitoring:     RX Monitoring 6/22/2021   Attestation -   Periodic Controlled Substance Monitoring Possible medication side effects, risk of tolerance/dependence & alternative treatments discussed. ;No signs of potential drug abuse or diversion identified. IBS history  miralax daily has been good for him    Patient Active Problem List   Diagnosis    Generalized pain    High risk medication use    IBS (irritable bowel syndrome)    Anxiety    Kidney disease    Anemia    Diverticulosis    Peptic ulcer disease    Coronary artery disease involving native heart with angina pectoris ([facility]    Substance abuse (HCC)    Chronic back pain    Chemical dependency ([facility]    Cardiomyopathy ([facility]    Hyperlipidemia    Ventricular tachycardia (HCC)    Convulsions (HCC)       Current Outpatient Medications   Medication Sig Dispense Refill    ALPRAZolam (XANAX) 0.5 MG tablet TAKE 1 TO 2 TABLETS BY MOUTH UP TO 3 TIMES DAILY. INTENDED SUPPLY: 30 DAYS. 120 tablet 0    gabapentin (NEURONTIN) 600 MG tablet take 1 tablet by mouth six times a day 180 tablet 2    carvedilol (COREG) 12.5 MG tablet TAKE 1 TABLET BY MOUTH TWICE DAILY.       pantoprazole (PROTONIX) 40 MG tablet take 1 tablet by mouth once daily      dicyclomine (BENTYL) 10 MG capsule take 1 capsule by mouth before meals and at bedtime      tamsulosin (FLOMAX) 0.4 MG capsule Take 1 capsule by mouth daily 90 capsule 3    desonide (DESOWEN) 0.05 % cream APPLY TO THE AFFECTED AREA 4. Coronary artery disease involving native heart with angina pectoris, unspecified vessel or lesion type (HCC)  CBC    Comprehensive Metabolic Panel    Lipid Panel   5. Chronic low back pain, unspecified back pain laterality, unspecified whether sciatica present     6. Memory loss     7. Long term prescription benzodiazepine use     8. Bilateral hearing loss, unspecified hearing loss type     9.  Screening PSA (prostate specific antigen)  PSA screening       bloodwork  Had covid shot     OARRS with xanax fills  Mood has been ok    Chronic conditions are stable  Continue current regimen  Follow up with appropriate specialists and here routinely for ongoing monitoring of chronic conditions    Moderate JUANIS Tejada MD

## 2021-06-29 DIAGNOSIS — F41.9 ANXIETY: ICD-10-CM

## 2021-07-02 RX ORDER — ALPRAZOLAM 0.5 MG/1
TABLET ORAL
Qty: 120 TABLET | Refills: 2 | Status: SHIPPED | OUTPATIENT
Start: 2021-07-02 | End: 2021-09-24

## 2021-08-05 RX ORDER — DESONIDE 0.5 MG/G
CREAM TOPICAL
Qty: 15 G | Refills: 3 | Status: SHIPPED | OUTPATIENT
Start: 2021-08-05

## 2021-08-17 RX ORDER — GABAPENTIN 600 MG/1
TABLET ORAL
Qty: 180 TABLET | Refills: 2 | Status: SHIPPED | OUTPATIENT
Start: 2021-08-17 | End: 2021-11-15

## 2021-08-27 DIAGNOSIS — F41.9 ANXIETY: ICD-10-CM

## 2021-08-27 RX ORDER — ALPRAZOLAM 0.5 MG/1
TABLET ORAL
Qty: 120 TABLET | Refills: 2 | Status: CANCELLED | OUTPATIENT
Start: 2021-08-27 | End: 2021-11-27

## 2021-08-27 NOTE — TELEPHONE ENCOUNTER
(Xanax)      Spoke to pharmacy the can fill med on the 29th and refills are available. Spoke to pt aware to call pharmacy for fill for the 29th.

## 2021-09-22 DIAGNOSIS — F41.9 ANXIETY: ICD-10-CM

## 2021-09-22 NOTE — TELEPHONE ENCOUNTER
Future Appointments     Provider   10/14/2021 Niko Pedersen MD   Department: [facility]   Appt Notes: 1 YR FLOW PVR PSA   10/27/2021 Linda Antunez MD   Department: [facility]   Appt Notes: Appt Reason: Routine Existing Condition Follow Up   Return in about 3 months (around 9/22/2021). , screened green 9/20   Booking Code: [id number]   Recent Visits    06/22/2021 Anxiety   [facility] Linda Antunez MD   03/10/2021 [facility] Linda Antunez MD

## 2021-09-24 RX ORDER — ALPRAZOLAM 0.5 MG/1
TABLET ORAL
Qty: 120 TABLET | Refills: 0 | Status: SHIPPED | OUTPATIENT
Start: 2021-09-24 | End: 2021-10-22

## 2021-09-27 RX ORDER — TAMSULOSIN HYDROCHLORIDE 0.4 MG/1
0.4 CAPSULE ORAL DAILY
Qty: 90 CAPSULE | Refills: 3 | Status: SHIPPED | OUTPATIENT
Start: 2021-09-27 | End: 2022-05-17 | Stop reason: DRUGHIGH

## 2021-10-20 DIAGNOSIS — F41.9 ANXIETY: ICD-10-CM

## 2021-10-21 ENCOUNTER — TELEPHONE (OUTPATIENT)
Dept: FAMILY MEDICINE CLINIC | Age: 61
End: 2021-10-21

## 2021-10-21 NOTE — TELEPHONE ENCOUNTER
Pt aware that the medication was sent to Dr. Xavier Jessica and he has not filled it yet and has 72 hours. Pt aware that he can discuss the labs at his appt with Dr. Xavier Jessica.

## 2021-10-21 NOTE — TELEPHONE ENCOUNTER
----- Message from Becki Sadler sent at 10/21/2021  4:04 PM EDT -----  Subject: Message to Provider    QUESTIONS  Information for Provider? PT would like to know if he can get the 1st   pneumonia vaccine at his appt. ? PT would also like to get tested for COVID   antibodies while he is there? PT would like to make sure his provider got   his request for his meds so he does not have to go all weekend without. Please give him a call and let him know this information.  ---------------------------------------------------------------------------  --------------  CALL BACK INFO  What is the best way for the office to contact you? OK to leave message on   voicemail  Preferred Call Back Phone Number? [phone]  ---------------------------------------------------------------------------  --------------  SCRIPT ANSWERS  Relationship to Patient?  Self

## 2021-10-22 RX ORDER — ALPRAZOLAM 0.5 MG/1
TABLET ORAL
Qty: 120 TABLET | Refills: 0 | Status: SHIPPED | OUTPATIENT
Start: 2021-10-22 | End: 2021-11-19

## 2021-10-25 ENCOUNTER — TELEPHONE (OUTPATIENT)
Dept: FAMILY MEDICINE CLINIC | Age: 61
End: 2021-10-25

## 2021-11-04 ENCOUNTER — OFFICE VISIT (OUTPATIENT)
Dept: FAMILY MEDICINE CLINIC | Age: 61
End: 2021-11-04
Payer: COMMERCIAL

## 2021-11-04 ENCOUNTER — TELEPHONE (OUTPATIENT)
Dept: UROLOGY | Age: 61
End: 2021-11-04

## 2021-11-04 VITALS
WEIGHT: 175.4 LBS | HEIGHT: 71 IN | HEART RATE: 92 BPM | OXYGEN SATURATION: 98 % | BODY MASS INDEX: 24.56 KG/M2 | TEMPERATURE: 96.2 F | DIASTOLIC BLOOD PRESSURE: 84 MMHG | SYSTOLIC BLOOD PRESSURE: 130 MMHG

## 2021-11-04 DIAGNOSIS — Z23 NEED FOR PROPHYLACTIC VACCINATION AGAINST STREPTOCOCCUS PNEUMONIAE (PNEUMOCOCCUS): ICD-10-CM

## 2021-11-04 DIAGNOSIS — I25.119 CORONARY ARTERY DISEASE INVOLVING NATIVE HEART WITH ANGINA PECTORIS, UNSPECIFIED VESSEL OR LESION TYPE (HCC): ICD-10-CM

## 2021-11-04 DIAGNOSIS — Z79.899 LONG TERM PRESCRIPTION BENZODIAZEPINE USE: ICD-10-CM

## 2021-11-04 DIAGNOSIS — N40.1 BPH WITH OBSTRUCTION/LOWER URINARY TRACT SYMPTOMS: Primary | ICD-10-CM

## 2021-11-04 DIAGNOSIS — N13.8 BPH WITH OBSTRUCTION/LOWER URINARY TRACT SYMPTOMS: Primary | ICD-10-CM

## 2021-11-04 DIAGNOSIS — F41.9 ANXIETY: Primary | ICD-10-CM

## 2021-11-04 DIAGNOSIS — Z11.52 ENCOUNTER FOR SCREENING FOR COVID-19: ICD-10-CM

## 2021-11-04 DIAGNOSIS — I42.9 CARDIOMYOPATHY, UNSPECIFIED TYPE (HCC): ICD-10-CM

## 2021-11-04 DIAGNOSIS — Z23 NEEDS FLU SHOT: ICD-10-CM

## 2021-11-04 LAB — PROSTATE SPECIFIC ANTIGEN: 3.86 NG/ML (ref 0–4)

## 2021-11-04 PROCEDURE — 1036F TOBACCO NON-USER: CPT | Performed by: FAMILY MEDICINE

## 2021-11-04 PROCEDURE — 3017F COLORECTAL CA SCREEN DOC REV: CPT | Performed by: FAMILY MEDICINE

## 2021-11-04 PROCEDURE — G0009 ADMIN PNEUMOCOCCAL VACCINE: HCPCS | Performed by: FAMILY MEDICINE

## 2021-11-04 PROCEDURE — G8482 FLU IMMUNIZE ORDER/ADMIN: HCPCS | Performed by: FAMILY MEDICINE

## 2021-11-04 PROCEDURE — G8427 DOCREV CUR MEDS BY ELIG CLIN: HCPCS | Performed by: FAMILY MEDICINE

## 2021-11-04 PROCEDURE — 90674 CCIIV4 VAC NO PRSV 0.5 ML IM: CPT | Performed by: FAMILY MEDICINE

## 2021-11-04 PROCEDURE — G0008 ADMIN INFLUENZA VIRUS VAC: HCPCS | Performed by: FAMILY MEDICINE

## 2021-11-04 PROCEDURE — G8420 CALC BMI NORM PARAMETERS: HCPCS | Performed by: FAMILY MEDICINE

## 2021-11-04 PROCEDURE — 90732 PPSV23 VACC 2 YRS+ SUBQ/IM: CPT | Performed by: FAMILY MEDICINE

## 2021-11-04 PROCEDURE — 99213 OFFICE O/P EST LOW 20 MIN: CPT | Performed by: FAMILY MEDICINE

## 2021-11-04 NOTE — PROGRESS NOTES
After obtaining consent, and per orders of Dr. Lobo Tillman, injection of flu and [id number] given in Left amd right deltoid by Candido Plummer CMA ([facility]). Patient instructed to remain in clinic for 20 minutes afterwards, and to report any adverse reaction to me immediately. Vaccine Information Sheet, \"Influenza - Inactivated\"  given to Sedonia Means, or parent/legal guardian of  Sedonia Means and verbalized understanding. Patient responses:    Have you ever had a reaction to a flu vaccine? No  Are you able to eat eggs without adverse effects? Yes  Do you have any current illness? No  Have you ever had [place] Syndrome? No    Flu vaccine given per order. Please see immunization tab.

## 2021-11-04 NOTE — PROGRESS NOTES
Chief Complaint   Patient presents with    Anxiety     check up    Other     discuss covid booster       HPI:  Amira Romo is a 59 y.o. male    Checkup/review chronic issues    Has had covid vaccine   Asks about booster     He has a pacemaker  Nonischemic cardiomyopathy  Follows with cardiology  Dr. Sherren Lim  Pacemaker checks   Has had virtual visits      Hx substance abuse  Was taken off all controlled substances at one time  Xanax was restarted   Takes 4 xanax/day chronically     Controlled Substances Monitoring:     RX Monitoring 11/4/2021   Attestation -   Periodic Controlled Substance Monitoring Possible medication side effects, risk of tolerance/dependence & alternative treatments discussed. ;No signs of potential drug abuse or diversion identified. IBS history  miralax daily has been good for him    Patient Active Problem List   Diagnosis    Generalized pain    High risk medication use    IBS (irritable bowel syndrome)    Anxiety    Kidney disease    Anemia    Diverticulosis    Peptic ulcer disease    Coronary artery disease involving native heart with angina pectoris ([facility]    Substance abuse (HCC)    Chronic back pain    Chemical dependency ([facility]    Cardiomyopathy ([facility]    Hyperlipidemia    Ventricular tachycardia (HCC)    Convulsions ([facility])       Current Outpatient Medications   Medication Sig Dispense Refill    ALPRAZolam (XANAX) 0.5 MG tablet TAKE 1 TO 2 TABLETS BY MOUTH UP TO 3 TIMES DAILY. INTENDED SUPPLY: 30 DAYS. 120 tablet 0    tamsulosin (FLOMAX) 0.4 MG capsule TAKE 1 CAPSULE BY MOUTH DAILY. 90 capsule 3    gabapentin (NEURONTIN) 600 MG tablet take 1 tablet by mouth six times a day 180 tablet 2    desonide (DESOWEN) 0.05 % cream APPLY TO THE AFFECTED AREA TWICE A DAY 15 g 3    carvedilol (COREG) 12.5 MG tablet TAKE 1 TABLET BY MOUTH TWICE DAILY.       pantoprazole (PROTONIX) 40 MG tablet take 1 tablet by mouth once daily      lisinopril (ZESTRIL) 2.5 MG tablet Take 2.5 mg by mouth daily. No current facility-administered medications for this visit. Patient's medications, allergies, past medical, surgical, social and family histories were reviewed and updated as appropriate. Review of Systems:   General ROS: negative for - chills, fatigue, fever, malaise, weight gain or weight loss  Respiratory ROS: no cough, shortness of breath, or wheezing  Cardiovascular ROS: no chest pain or dyspnea on exertion  Gastrointestinal ROS: constant bowel cramping, constipation  Genito-Urinary ROS: no dysuria, trouble voiding  Musculoskeletal ROS: chronic back pain    In general patient otherwise reports feeling well. Physical Exam:  /84 (Site: Right Upper Arm)   Pulse 92   Temp 96.2 °F (35.7 °C)   Ht 5' 11\" (1.803 m)   Wt 175 lb 6.4 oz (79.6 kg)   SpO2 98%   BMI 24.46 kg/m²     Gen: Well, NAD, Alert, Oriented x 3   HEENT: EOMI, eyes clear, MMM  Skin: no rash or jaundice  Neck: no significant lymphadenopathy or thyromegaly  Lungs: CTA B w/out Rales/Wheezes/Rhonchi, Good respiratory effort   Heart: RRR, S1S2, w/out M/R/G, non-displaced PMI   Abdomen: Soft NT/ND, w/out R/G, w/ +BSx4   Ext: No C/C/E Bilaterally. Neuro: Neurovascularly intact w/ Sensory/Motor intact UE/LE Bilaterally. Psych: calm/euthymic    Lab Results   Component Value Date    WBC 3.4 (L) 06/22/2021    HGB 14.3 06/22/2021    HCT 42.9 06/22/2021     06/22/2021    CHOL 219 (H) 06/22/2021    TRIG 60 06/22/2021     (H) 06/22/2021    ALT 47 (H) 06/22/2021    AST 51 (H) 06/22/2021     06/22/2021    K 4.6 06/22/2021     06/22/2021    CREATININE 0.87 06/22/2021    BUN 12 06/22/2021    CO2 29 06/22/2021    TSH 3.450 06/22/2021    PSA 2.59 06/22/2021    INR 1.2 07/17/2013    LABA1C 4.9 09/03/2020         A&P   Diagnosis Orders   1. Anxiety     2.  Coronary artery disease involving native heart with angina pectoris, unspecified vessel or lesion type ([facility]     3. Cardiomyopathy, unspecified type ([facility]     4. Long term prescription benzodiazepine use     5. Need for prophylactic vaccination against Streptococcus pneumoniae (pneumococcus)  PNEUMOVAX 23 subcutaneous/IM (Pneumococcal polysaccharide vaccine 23-valent >= 1yo)   6. Encounter for screening for COVID-19  Covid-19, Antibody   7.  Needs flu shot  INFLUENZA, MDCK QUADV, 2 YRS AND OLDER, IM, PF, PREFILL SYR OR SDV, 0.5ML (FLUCELVAX QUADV, PF)       bloodwork  Flu shot     OARRS with xanax fills  Mood has been ok    Chronic conditions are stable  Continue current regimen  Follow up with appropriate specialists and here routinely for ongoing monitoring of chronic conditions        Pawel Garza MD

## 2021-11-06 LAB — SARS-COV-2 ANTIBODY, TOTAL: NEGATIVE

## 2021-11-09 ENCOUNTER — OFFICE VISIT (OUTPATIENT)
Dept: UROLOGY | Age: 61
End: 2021-11-09
Payer: COMMERCIAL

## 2021-11-09 VITALS
HEART RATE: 79 BPM | BODY MASS INDEX: 23.8 KG/M2 | SYSTOLIC BLOOD PRESSURE: 120 MMHG | DIASTOLIC BLOOD PRESSURE: 80 MMHG | HEIGHT: 71 IN | WEIGHT: 170 LBS

## 2021-11-09 DIAGNOSIS — N40.1 BPH WITH OBSTRUCTION/LOWER URINARY TRACT SYMPTOMS: Primary | ICD-10-CM

## 2021-11-09 DIAGNOSIS — N13.8 BPH WITH OBSTRUCTION/LOWER URINARY TRACT SYMPTOMS: Primary | ICD-10-CM

## 2021-11-09 PROCEDURE — 3017F COLORECTAL CA SCREEN DOC REV: CPT | Performed by: UROLOGY

## 2021-11-09 PROCEDURE — G8482 FLU IMMUNIZE ORDER/ADMIN: HCPCS | Performed by: UROLOGY

## 2021-11-09 PROCEDURE — G8420 CALC BMI NORM PARAMETERS: HCPCS | Performed by: UROLOGY

## 2021-11-09 PROCEDURE — 1036F TOBACCO NON-USER: CPT | Performed by: UROLOGY

## 2021-11-09 PROCEDURE — 99214 OFFICE O/P EST MOD 30 MIN: CPT | Performed by: UROLOGY

## 2021-11-09 PROCEDURE — G8427 DOCREV CUR MEDS BY ELIG CLIN: HCPCS | Performed by: UROLOGY

## 2021-11-09 RX ORDER — TAMSULOSIN HYDROCHLORIDE 0.4 MG/1
0.4 CAPSULE ORAL 2 TIMES DAILY
Qty: 180 CAPSULE | Refills: 3 | Status: SHIPPED | OUTPATIENT
Start: 2021-11-09

## 2021-11-09 ASSESSMENT — ENCOUNTER SYMPTOMS
ABDOMINAL DISTENTION: 0
ABDOMINAL PAIN: 0
SHORTNESS OF BREATH: 0

## 2021-11-09 NOTE — PROGRESS NOTES
Subjective:      Patient ID: Ramin Min is a 59 y.o. male    HPI  This is a 60 yo white male with h/o IBS, Anxiety, Anemia, GERD, DJD/Scoliosis, CAD/Cardiomyopathy, Pacemaker/Arrythmia, HTN, BPH w/LUTs on Flomax and back in follow-up. Since last seen on 10/13/20, he has more frequency and urgency over the last yr. He has DF > 8 and NF 2-3. He has no hematuria or dysuria or pain. He does get hesitancy and some pain if he misses his daily Flomax. He has no UTI's. He gets some mild but stable PVD. He has a grandfather with a h/o prostate cancer diagnosed while he was in his 66's. He has no new medical or surgical problems. I reviewed the interval PSA. Past Medical History:   Diagnosis Date    Alcoholic ([address]     20 years ago    Anxiety     Arthritis     Asthma     CAD (coronary artery disease)     Chemical dependency ([address] AUG 2010 PER PT    Chronic back pain     Convulsions ([address] 3/10/2021    Diverticulosis     Fibromyalgia     Hyperlipidemia     IBS (irritable bowel syndrome) 10/13/2011    IV drug abuse ([address]     Nephrotic syndrome     remission    Osteoarthritis     Panic attacks     Peptic ulcer disease     Rhabdomyolysis 2013    Substance abuse ([address]     ULTRAM     Past Surgical History:   Procedure Laterality Date    APPENDECTOMY      CARDIAC CATHETERIZATION      COLONOSCOPY  2017    Suki Aviles MD    UPPER GASTROINTESTINAL ENDOSCOPY  2017    [facility] Katelyn Duran MD     UPPER GASTROINTESTINAL ENDOSCOPY  10/09/2017    DR. DESIR     Social History     Socioeconomic History    Marital status:      Spouse name: None    Number of children: None    Years of education: None    Highest education level: None   Occupational History    None   Tobacco Use    Smoking status: Former Smoker     Packs/day: 1.50     Years: 20.00     Pack years: 30.00     Types: Cigarettes     Quit date: 10/13/1997     Years since quittin.0    Smokeless tobacco: Never Used   Substance and Sexual Activity    Alcohol use: No     Comment: H/O SUBSTANCE ABUSE INCLUDING ALCOHOL    Drug use: No     Comment: CHEMICAL DEPENDENCY/ADDICTION    Sexual activity: Not Currently   Other Topics Concern    None   Social History Narrative    None     Social Determinants of Health     Financial Resource Strain: Low Risk     Difficulty of Paying Living Expenses: Not hard at all   Food Insecurity: No Food Insecurity    Worried About Running Out of Food in the Last Year: Never true    Juan C of Food in the Last Year: Never true   Transportation Needs:     Lack of Transportation (Medical): Not on file    Lack of Transportation (Non-Medical): Not on file   Physical Activity:     Days of Exercise per Week: Not on file    Minutes of Exercise per Session: Not on file   Stress:     Feeling of Stress : Not on file   Social Connections:     Frequency of Communication with Friends and Family: Not on file    Frequency of Social Gatherings with Friends and Family: Not on file    Attends Yazidism Services: Not on file    Active Member of [address] [place] [address] or Organizations: Not on file    Attends Club or Organization Meetings: Not on file    Marital Status: Not on file   Intimate Partner Violence:     Fear of Current or Ex-Partner: Not on file    Emotionally Abused: Not on file    Physically Abused: Not on file    Sexually Abused: Not on file   Housing Stability:     Unable to Pay for Housing in the Last Year: Not on file    Number of [place] in the Last Year: Not on file    Unstable Housing in the Last Year: Not on file     Family History   Problem Relation Age of Onset    Diabetes Mother     Early Death Father     Diabetes Other      Current Outpatient Medications   Medication Sig Dispense Refill    ALPRAZolam (XANAX) 0.5 MG tablet TAKE 1 TO 2 TABLETS BY MOUTH UP TO 3 TIMES DAILY. INTENDED SUPPLY: 30 DAYS. 120 tablet 0    tamsulosin (FLOMAX) 0.4 MG capsule TAKE 1 CAPSULE BY MOUTH DAILY.  90 capsule 3    gabapentin (NEURONTIN) 600 MG tablet take 1 tablet by mouth six times a day 180 tablet 2    desonide (DESOWEN) 0.05 % cream APPLY TO THE AFFECTED AREA TWICE A DAY 15 g 3    carvedilol (COREG) 12.5 MG tablet TAKE 1 TABLET BY MOUTH TWICE DAILY.  pantoprazole (PROTONIX) 40 MG tablet take 1 tablet by mouth once daily      lisinopril (ZESTRIL) 2.5 MG tablet Take 2.5 mg by mouth daily. No current facility-administered medications for this visit. Cymbalta [duloxetine hcl]  reviewed      Review of Systems   Constitutional: Negative for unexpected weight change. Respiratory: Negative for shortness of breath. Cardiovascular: Negative for chest pain. Gastrointestinal: Negative for abdominal distention and abdominal pain. Genitourinary: Positive for frequency and urgency. Negative for dysuria and flank pain. Objective:   Physical Exam  Constitutional:       Appearance: Normal appearance. Genitourinary:     Prostate: Enlarged. Not tender and no nodules present. Rectum: No external hemorrhoid. Neurological:      Mental Status: He is alert. Psychiatric:         Mood and Affect: Mood normal.            PSA   Date Value Ref Range Status   11/04/2021 3.86 0.00 - 4.00 ng/mL Final   06/22/2021 2.59 0.00 - 5.40 ng/mL Final     Comment:     When the Total PSA is between 3.00 and 10.00 ng/mL, consider  requesting a Free PSA to aid in diagnosis. 06/06/2017 2.40 0.00 - 3.89 ng/mL Final     Comment:     When the Total PSA is between 3.00 and 10.00 ng/mL, consider  requesting a Free PSA to aid in diagnosis. 02/03/2016 2.76 0.00 - 3.89 ng/mL Final   10/06/2015 3.3 0.0 - 4.0 ng/mL Final     Comment:     INTERPRETIVE INFORMATION: Prostate Specific Antigen  The Roche PSA electrochemiluminescent immunoassay was used. Results  obtained  with different test methods or kits cannot be used interchangeably.  The  Roche  PSA method is approved for use as an aid in the detection of prostate  cancer  when used in conjunction with a digital rectal exam in men age 48 and  older. The Roche PSA method is also indicated for the serial measurement of  PSA to  aid in the prognosis and management of prostate cancer patients. Elevated PSA  concentrations can only suggest the presence of prostate cancer until  biopsy  is performed. PSA concentrations can also be elevated in benign  prostatic  hyperplasia or inflammatory conditions of the prostate. PSA is  generally not  elevated in healthy men or men with non-prostatic carcinoma. Assessment: This is a 60 yo white male with h/o IBS, Anxiety, Anemia, GERD, DJD/Scoliosis, CAD/Cardiomyopathy, HTN,  Pacemaker, HTN, BPH w/LUTs on Flomax and has more frequency since last seen that is intermittent. I discussed dietary irritants to the urinary tract and he wants to try Flomax BID. The proper dosing and SE were reviewed. Will see back in 6 mo to follow the PSA more closely given the rise but still in normal range and he agrees. Plan:      1. Dietary irritant info  2. F/U 6 mo for PSA and Flow/PVR  3.  Flomax 0.4 mg po BID, #180 with 3 refills        Tanya Abel MD

## 2021-11-15 RX ORDER — GABAPENTIN 600 MG/1
TABLET ORAL
Qty: 180 TABLET | Refills: 2 | Status: SHIPPED | OUTPATIENT
Start: 2021-11-15 | End: 2022-02-21

## 2021-11-15 NOTE — TELEPHONE ENCOUNTER
Requesting medication refill.  Please approve or deny this request.    Rx requested:  Requested Prescriptions     Pending Prescriptions Disp Refills    gabapentin (NEURONTIN) 600 MG tablet [Pharmacy Med Name: GABAPENTIN 600 MG TABLET] 180 tablet 2     Sig: take 1 tablet by mouth six times a day       Last Office Visit:   11/4/2021    Next Visit Date:  Future Appointments   Date Time Provider Yareli Brownlee   5/10/2022  8:30 AM MD Curry Chambers

## 2021-11-17 DIAGNOSIS — F41.9 ANXIETY: ICD-10-CM

## 2021-11-19 RX ORDER — ALPRAZOLAM 0.5 MG/1
TABLET ORAL
Qty: 120 TABLET | Refills: 0 | Status: SHIPPED | OUTPATIENT
Start: 2021-11-19 | End: 2021-12-16

## 2021-12-15 DIAGNOSIS — F41.9 ANXIETY: ICD-10-CM

## 2021-12-16 RX ORDER — ALPRAZOLAM 0.5 MG/1
TABLET ORAL
Qty: 120 TABLET | Refills: 0 | Status: SHIPPED | OUTPATIENT
Start: 2021-12-16 | End: 2022-01-13

## 2022-01-12 DIAGNOSIS — F41.9 ANXIETY: ICD-10-CM

## 2022-01-13 RX ORDER — ALPRAZOLAM 0.5 MG/1
TABLET ORAL
Qty: 120 TABLET | Refills: 0 | Status: SHIPPED | OUTPATIENT
Start: 2022-01-13 | End: 2022-02-09

## 2022-02-09 DIAGNOSIS — F41.9 ANXIETY: ICD-10-CM

## 2022-02-09 RX ORDER — ALPRAZOLAM 0.5 MG/1
TABLET ORAL
Qty: 120 TABLET | Refills: 0 | Status: SHIPPED | OUTPATIENT
Start: 2022-02-09 | End: 2022-03-10

## 2022-02-19 NOTE — TELEPHONE ENCOUNTER
Future Appointments    Encounter Information    Provider Department Appt Notes   3/15/2022 Marcine Duverney, MD [facility] Primary Care Appt Reason: Routine Existing Condition Follow Up   3 month check up; screened green   Booking Code: [email or web address]   5/10/2022 Ramesh Garland MD [facility] Urology 6 Mos PSA, Flow PVR        Past Visits    Date Provider Specialty Visit Type Primary Dx   11/09/2021 Ramesh Garland MD Urology Office Visit BPH with obstruction/lower urinary tract symptoms   11/04/2021 Marcine Duverney, MD Family Medicine Office Visit Anxiety   06/22/2021 Marcine Duverney, MD Family Medicine Office Visit Anxiety   03/10/2021 Marcine Duverney, MD Family Medicine Virtual Visit Anxiety   12/10/2020 [facility]

## 2022-02-21 RX ORDER — GABAPENTIN 600 MG/1
TABLET ORAL
Qty: 180 TABLET | Refills: 2 | Status: SHIPPED | OUTPATIENT
Start: 2022-02-21 | End: 2022-05-23

## 2022-03-09 DIAGNOSIS — F41.9 ANXIETY: ICD-10-CM

## 2022-03-10 RX ORDER — ALPRAZOLAM 0.5 MG/1
TABLET ORAL
Qty: 120 TABLET | Refills: 0 | Status: SHIPPED | OUTPATIENT
Start: 2022-03-10 | End: 2022-04-05

## 2022-04-05 DIAGNOSIS — F41.9 ANXIETY: ICD-10-CM

## 2022-04-05 RX ORDER — ALPRAZOLAM 0.5 MG/1
TABLET ORAL
Qty: 120 TABLET | Refills: 0 | Status: SHIPPED | OUTPATIENT
Start: 2022-04-05 | End: 2022-04-28

## 2022-04-05 NOTE — TELEPHONE ENCOUNTER
Future Appointments    Encounter Information    Provider Department Appt Notes   4/12/2022 Ascencion Carson MD [facility] Primary Care Appt Reason: Routine Existing Condition Follow Up   3 month check up; screened green   Booking Code: [email or web address]   5/17/2022 Panda Dallas MD [facility] Urology 6 Mos PSA, Flow PVR        Past Visits    Date Provider Specialty Visit Type Primary Dx   11/09/2021 Panda Dallas MD Urology Office Visit BPH with obstruction/lower urinary tract symptoms   11/04/2021 Ascencion Carson MD Family Medicine Office Visit Anxiety   06/22/2021 Ascencion Carson MD Family Medicine Office Visit Anxiety   03/10/2021 Ascencion Carson MD Family Medicine Virtual Visit Anxiety   12/10/2020 [facility]

## 2022-04-27 DIAGNOSIS — F41.9 ANXIETY: ICD-10-CM

## 2022-04-28 RX ORDER — ALPRAZOLAM 0.5 MG/1
TABLET ORAL
Qty: 120 TABLET | Refills: 0 | Status: SHIPPED | OUTPATIENT
Start: 2022-04-28 | End: 2022-05-26

## 2022-05-17 ENCOUNTER — TELEMEDICINE (OUTPATIENT)
Dept: FAMILY MEDICINE CLINIC | Age: 62
End: 2022-05-17
Payer: COMMERCIAL

## 2022-05-17 DIAGNOSIS — R05.9 COUGH: ICD-10-CM

## 2022-05-17 DIAGNOSIS — U07.1 COVID-19: Primary | ICD-10-CM

## 2022-05-17 PROCEDURE — 99442 PR PHYS/QHP TELEPHONE EVALUATION 11-20 MIN: CPT | Performed by: NURSE PRACTITIONER

## 2022-05-17 RX ORDER — GUAIFENESIN 600 MG/1
600 TABLET, EXTENDED RELEASE ORAL 2 TIMES DAILY
Qty: 30 TABLET | Refills: 0 | Status: SHIPPED | OUTPATIENT
Start: 2022-05-17 | End: 2022-05-25 | Stop reason: ALTCHOICE

## 2022-05-17 RX ORDER — BENZONATATE 100 MG/1
100 CAPSULE ORAL 3 TIMES DAILY PRN
Qty: 21 CAPSULE | Refills: 0 | Status: SHIPPED | OUTPATIENT
Start: 2022-05-17 | End: 2022-05-24

## 2022-05-17 RX ORDER — METHYLPREDNISOLONE 4 MG/1
TABLET ORAL
Qty: 21 TABLET | Refills: 0 | Status: SHIPPED | OUTPATIENT
Start: 2022-05-17 | End: 2022-05-23

## 2022-05-17 ASSESSMENT — ENCOUNTER SYMPTOMS
COLOR CHANGE: 0
CONSTIPATION: 0
EYE PAIN: 0
DIARRHEA: 0
SHORTNESS OF BREATH: 1
ABDOMINAL PAIN: 0
CHEST TIGHTNESS: 0
SORE THROAT: 1
TROUBLE SWALLOWING: 0
COUGH: 1

## 2022-05-17 NOTE — PROGRESS NOTES
This visit began at 21 [phone]. Location of the visit was the [address] [place] [address] primary care site. TELEHEALTH APPOINTMENT  Patient has been screened to determine that this visit qualifies for a \"Telephone Visit\". Patient is currently established with the current medical practice, the condition being reviewed was not addressed within the previous 7 days and is not likely be determined to need a procedure within the next 24 hours. This visit was via telephone due to the restrictions of the COVID-19 pandemic. All issues as below were discussed and addressed but no physical exam was performed. It was felt the patient should be evaluated in the clinic there will be comment below demonstrating they were directed there. The patient is aware and has given verbal consent to be billed for this telephone encounter. Chief Complaint   Patient presents with    Positive For Covid-19     tested positive last night, states that he is very achey and has a productive cough. states that he is taking advil. HPI    Covid-started with flu like symptoms on Sunday, worse on Monday, took at home covid test and it was positive. Pt is having shortness of breath, productive cough, body aches, sweats/chills. No fever currently but did take advil recently, was 99.8 yesterday morning. Breathing is okay while at rest, difficulty taking deep breath. PMH:    Current Outpatient Medications on File Prior to Visit   Medication Sig Dispense Refill    ALPRAZolam (XANAX) 0.5 MG tablet take 1 to 2 tablets by mouth UP TO 3 TIMES DAILY. INTENDED DAY SUPPLY 30 120 tablet 0    gabapentin (NEURONTIN) 600 MG tablet take 1 tablet by mouth six times a day 180 tablet 2    tamsulosin (FLOMAX) 0.4 MG capsule Take 1 capsule by mouth 2 times daily 180 capsule 3    desonide (DESOWEN) 0.05 % cream APPLY TO THE AFFECTED AREA TWICE A DAY 15 g 3    carvedilol (COREG) 12.5 MG tablet TAKE 1 TABLET BY MOUTH TWICE DAILY.       pantoprazole (PROTONIX) 40 MG tablet take 1 tablet by mouth once daily      lisinopril (ZESTRIL) 2.5 MG tablet Take 2.5 mg by mouth daily. No current facility-administered medications on file prior to visit. Past Medical History:   Diagnosis Date    Al[facility]olic ([facility]     20 years ago    Anxiety     Arthritis     Asthma     CAD (coronary artery disease)     Chemical dependency ([facility] AUG 2010 PER PT    Chronic back pain     Convulsions ([facility] 3/10/2021    Diverticulosis     Fibromyalgia     Hyperlipidemia     IBS (irritable bowel syndrome) 10/13/2011    IV drug abuse ([facility]     Nephrotic syndrome     remission    Osteoarthritis     Panic attacks     Peptic ulcer disease     Rhabdomyolysis 2013    Substance abuse ([facility]     ULTRAM     Past Surgical History:   Procedure Laterality Date    APPENDECTOMY      CARDIAC CATHETERIZATION      COLONOSCOPY  2017    Adriana Gonzales MD    UPPER GASTROINTESTINAL ENDOSCOPY  2017    [facility] Merissa Vitale MD     UPPER GASTROINTESTINAL ENDOSCOPY  10/09/2017    DR. DESIR     Social History     Socioeconomic History    Marital status:      Spouse name: Not on file    Number of children: Not on file    Years of education: Not on file    Highest education level: Not on file   Occupational History    Not on file   Tobacco Use    Smoking status: Former Smoker     Packs/day: 1.50     Years: 20.00     Pack years: 30.00     Types: Cigarettes     Quit date: 10/13/1997     Years since quittin.6    Smokeless tobacco: Never Used   Substance and Sexual Activity    Alcohol use: No     Comment: H/O SUBSTANCE ABUSE INCLUDING ALCOHOL    Drug use: No     Comment: CHEMICAL DEPENDENCY/ADDICTION    Sexual activity: Not Currently   Other Topics Concern    Not on file   Social History Narrative    Not on file     Social Determinants of Health     Financial Resource Strain: Low Risk     Difficulty of Paying Living Expenses: Not hard at all   Food Insecurity: No Food Insecurity    Worried About Running Out of Food in the Last Year: Never true    Juan C of Food in the Last Year: Never true   Transportation Needs:     Lack of Transportation (Medical): Not on file    Lack of Transportation (Non-Medical): Not on file   Physical Activity:     Days of Exercise per Week: Not on file    Minutes of Exercise per Session: Not on file   Stress:     Feeling of Stress : Not on file   Social Connections:     Frequency of Communication with Friends and Family: Not on file    Frequency of Social Gatherings with Friends and Family: Not on file    Attends Restorationism Services: Not on file    Active Member of [address] [place] [address] [employer] or Organizations: Not on file    Attends Club or Organization Meetings: Not on file    Marital Status: Not on file   Intimate Partner Violence:     Fear of Current or Ex-Partner: Not on file    Emotionally Abused: Not on file    Physically Abused: Not on file    Sexually Abused: Not on file   Housing Stability:     Unable to Pay for Housing in the Last Year: Not on file    Number of [place] in the Last Year: Not on file    Unstable Housing in the Last Year: Not on file     Family History   Problem Relation Age of Onset    Diabetes Mother     Early Death Father     Diabetes Other      Allergies:  Cymbalta [duloxetine hcl] and Propoxyphene    Review of Systems   Constitutional: Positive for chills, diaphoresis and fatigue. Negative for activity change, appetite change and fever. HENT: Positive for congestion and sore throat. Negative for ear pain, hearing loss and trouble swallowing. Eyes: Negative for pain and visual disturbance. Respiratory: Positive for cough and shortness of breath. Negative for chest tightness. Cardiovascular: Negative for chest pain, palpitations and leg swelling. Gastrointestinal: Negative for abdominal pain, constipation and diarrhea. Genitourinary: Negative for difficulty urinating.    Musculoskeletal: Positive for arthralgias and myalgias. Skin: Negative for color change and rash. Neurological: Negative for dizziness and light-headedness. Psychiatric/Behavioral: Negative for dysphoric mood. The patient is not nervous/anxious. PHYSICAL EXAM/ RESULTS    There were no vitals taken for this visit. Vitals signs: pt does not have the proper equipment to take all VS.    The physical is not performed due to the visit being a telephone counter as indicated due to the restrictions of the COVID-19 pandemic    No results found for this or any previous visit (from the past 2016 hour(s)). Assessment:       Diagnosis Orders   1. COVID-19  methylPREDNISolone (MEDROL DOSEPACK) 4 MG tablet    guaiFENesin (MUCINEX) 600 MG extended release tablet   2. Cough  benzonatate (TESSALON) 100 MG capsule         No orders of the defined types were placed in this encounter. Plan as above. Discussed quarantine recommendations and ER precautions. F/u if no improvement. Side effects, adverse effects of the medication prescribed today, as well as treatment plan/ rationale and result expectations have been discussed with the patient who expresses understanding and desires to proceed. Close follow up to evaluate treatment results and for coordination of care. I have reviewed the patient's medical history in detail and updated the computerized patient record. As always, patient is advised that if symptoms worsen in any way they will proceed to the nearest emergency room. Plan:   Return if symptoms worsen or fail to improve. There are no Patient Instructions on file for this visit. This visit ended at 1025.     ELINA Beltrán, NP-C.

## 2022-05-23 RX ORDER — GABAPENTIN 600 MG/1
TABLET ORAL
Qty: 180 TABLET | Refills: 2 | Status: SHIPPED | OUTPATIENT
Start: 2022-05-23 | End: 2022-08-26

## 2022-05-25 ENCOUNTER — TELEMEDICINE (OUTPATIENT)
Dept: FAMILY MEDICINE CLINIC | Age: 62
End: 2022-05-25
Payer: COMMERCIAL

## 2022-05-25 DIAGNOSIS — M25.519 SHOULDER PAIN, UNSPECIFIED CHRONICITY, UNSPECIFIED LATERALITY: ICD-10-CM

## 2022-05-25 DIAGNOSIS — F19.20 CHEMICAL DEPENDENCY (HCC): ICD-10-CM

## 2022-05-25 DIAGNOSIS — I42.9 CARDIOMYOPATHY, UNSPECIFIED TYPE (HCC): ICD-10-CM

## 2022-05-25 DIAGNOSIS — F41.9 ANXIETY: Primary | ICD-10-CM

## 2022-05-25 DIAGNOSIS — I47.20 VENTRICULAR TACHYCARDIA: ICD-10-CM

## 2022-05-25 DIAGNOSIS — I25.119 CORONARY ARTERY DISEASE INVOLVING NATIVE HEART WITH ANGINA PECTORIS, UNSPECIFIED VESSEL OR LESION TYPE (HCC): ICD-10-CM

## 2022-05-25 DIAGNOSIS — R56.9 CONVULSIONS, UNSPECIFIED CONVULSION TYPE (HCC): ICD-10-CM

## 2022-05-25 DIAGNOSIS — F41.9 ANXIETY: ICD-10-CM

## 2022-05-25 PROCEDURE — 99442 PR PHYS/QHP TELEPHONE EVALUATION 11-20 MIN: CPT | Performed by: FAMILY MEDICINE

## 2022-05-25 RX ORDER — METHYLPREDNISOLONE 4 MG/1
TABLET ORAL
Qty: 1 KIT | Refills: 0 | Status: SHIPPED | OUTPATIENT
Start: 2022-05-25

## 2022-05-25 ASSESSMENT — PATIENT HEALTH QUESTIONNAIRE - PHQ9
SUM OF ALL RESPONSES TO PHQ QUESTIONS 1-9: 0
1. LITTLE INTEREST OR PLEASURE IN DOING THINGS: 0
SUM OF ALL RESPONSES TO PHQ QUESTIONS 1-9: 0
SUM OF ALL RESPONSES TO PHQ QUESTIONS 1-9: 0
SUM OF ALL RESPONSES TO PHQ9 QUESTIONS 1 & 2: 0
SUM OF ALL RESPONSES TO PHQ QUESTIONS 1-9: 0
2. FEELING DOWN, DEPRESSED OR HOPELESS: 0

## 2022-05-25 NOTE — PROGRESS NOTES
2022    TELEHEALTH EVALUATION -- Audio/Visual (During ZCJAP-05 public health emergency)    Due to COVID 19 outbreak, patient's office visit was converted to a virtual visit. Patient was contacted and agreed to proceed with a virtual visit via Telephone Visit  The risks and benefits of converting to a virtual visit were discussed in light of the current infectious disease epidemic. Patient also understood that insurance coverage and co-pays are up to their individual insurance plans.     HPI:    Apolinar Chantal (:  1960) has requested an audio/video evaluation for the following concern(s):  Chief Complaint   Patient presents with    Medication Check     py currently has covid     Follow up, medication review/refill    Currently positive for covid  Symptoms have improved  He did virtual with Trent Bhatt and has finished up some meds    Long-term xanax 120/month    Was still positive as of a few days ago      Patient Active Problem List   Diagnosis    Generalized pain    High risk medication use    IBS (irritable bowel syndrome)    Anxiety    Kidney disease    Anemia    Diverticulosis    Peptic ulcer disease    Coronary artery disease involving native heart with angina pectoris ([address]    Substance abuse ([address]    Chronic back pain    Chemical dependency ([address]    Cardiomyopathy ([address]    Hyperlipidemia    Ventricular tachycardia ([address]    Convulsions ([address]     Past Medical History:   Diagnosis Date    Alcoholic ([address]     20 years ago    Anxiety     Arthritis     Asthma     CAD (coronary artery disease)     Chemical dependency ([address] AUG 2010 PER PT    Chronic back pain     Convulsions ([address] 3/10/2021    Diverticulosis     Fibromyalgia     Hyperlipidemia     IBS (irritable bowel syndrome) 10/13/2011    IV drug abuse ([address]     Nephrotic syndrome     remission    Osteoarthritis     Panic attacks     Peptic ulcer disease     Rhabdomyolysis 2013    Substance abuse ([address]     Colton Dave Review of Systems  Otherwise physically feels healthy without sob/palpitations/chest pain/f/c/n/v/weight gain/weight loss/abd pain    Prior to Visit Medications    Medication Sig Taking? Authorizing Provider   methylPREDNISolone (MEDROL DOSEPACK) 4 MG tablet Take by mouth. Yes Galdino Padilla MD   gabapentin (NEURONTIN) 600 MG tablet take 1 tablet by mouth six times a day Yes Galdino Padilla MD   ALPRAZolam Brooksie Kuster) 0.5 MG tablet take 1 to 2 tablets by mouth UP TO 3 TIMES DAILY. INTENDED DAY SUPPLY 30 Yes Galdino Padilla MD   tamsulosin (FLOMAX) 0.4 MG capsule Take 1 capsule by mouth 2 times daily Yes Fredrick Mortimer, MD   desonide (DESOWEN) 0.05 % cream APPLY TO THE AFFECTED AREA TWICE A DAY Yes Galdino Padilla MD   carvedilol (COREG) 12.5 MG tablet TAKE 1 TABLET BY MOUTH TWICE DAILY. Yes Historical Provider, MD   pantoprazole (PROTONIX) 40 MG tablet take 1 tablet by mouth once daily Yes Historical Provider, MD   lisinopril (ZESTRIL) 2.5 MG tablet Take 2.5 mg by mouth daily. Yes Historical Provider, MD       Social History     Tobacco Use    Smoking status: Former Smoker     Packs/day: 1.50     Years: 20.00     Pack years: 30.00     Types: Cigarettes     Quit date: 10/13/1997     Years since quittin.6    Smokeless tobacco: Never Used   Substance Use Topics    Alcohol use: No     Comment: H/O SUBSTANCE ABUSE INCLUDING ALCOHOL    Drug use: No     Comment: CHEMICAL DEPENDENCY/ADDICTION            Physical Exam:  Patient-Reported Vitals 2022   Patient-Reported Weight 170 lb   Patient-Reported Height 5'11   Patient-Reported Temperature -        Gen: Well, NAD, Alert, Oriented x 3   Conversant/pleasant/appropriate  Psych: euthymic        Due to this being a TeleHealth encounter, evaluation of the following organ systems is limited: Vitals/Constitutional/EENT/Resp/CV/GI//MS/Neuro/Skin/Heme-Lymph-Imm.     Lab Results   Component Value Date    WBC 3.4 (L) 2021    HGB 14.3 2021    HCT 42.9 2021  06/22/2021    CHOL 219 (H) 06/22/2021    TRIG 60 06/22/2021     (H) 06/22/2021    ALT 47 (H) 06/22/2021    AST 51 (H) 06/22/2021     06/22/2021    K 4.6 06/22/2021     06/22/2021    CREATININE 0.87 06/22/2021    BUN 12 06/22/2021    CO2 29 06/22/2021    TSH 3.450 06/22/2021    PSA 3.86 11/04/2021    INR 1.2 07/17/2013    LABA1C 4.9 09/03/2020         ASSESSMENT/PLAN:     Diagnosis Orders   1. Anxiety     2. Cardiomyopathy, unspecified type ([address]     3. Chemical dependency ([address]     4. Ventricular tachycardia ([address]     5. Convulsions, unspecified convulsion type ([address]     6. Coronary artery disease involving native heart with angina pectoris, unspecified vessel or lesion type ([address]     7. Shoulder pain, unspecified chronicity, unspecified laterality  methylPREDNISolone (MEDROL DOSEPACK) 4 MG tablet       Refills coming due    Would like repeat medrol andrea to have because he had shoulder pain relief    Otherwise doing well      No follow-ups on file. No more than 3-4 xanax/day    Controlled Substance Monitoring:    Acute and Chronic Pain Monitoring:   RX Monitoring 11/4/2021   Attestation -   Periodic Controlled Substance Monitoring Possible medication side effects, risk of tolerance/dependence & alternative treatments discussed. ;No signs of potential drug abuse or diversion identified. This is a total of 2-2.5mg/day, which is reasonable  Polypharmacy risk          An  electronic signature was used to authenticate this note. --Colten Norman MD on 5/25/2022 at 10:59 AM        Pursuant to the emergency declaration under the [facility], [facility] waiver authority and the [employer], this Virtual  Visit was conducted, with patient's consent, to reduce the patient's risk of exposure to COVID-19 and provide continuity of care for an established patient.      11 minute call

## 2022-05-25 NOTE — TELEPHONE ENCOUNTER
Requesting medication refill. Please approve or deny this request.    Rx requested:  Requested Prescriptions     Pending Prescriptions Disp Refills    ALPRAZolam (XANAX) 0.5 MG tablet [Pharmacy Med Name: ALPRAZOLAM 0.5 MG TABLET] 120 tablet      Sig: take 1 to 2 tablets by mouth UP TO 3 TIMES DAILY.  INTENDED DAY SUPPLY 30       Last Office Visit:   11/4/2021    Next Visit Date:  5/25/22

## 2022-05-26 RX ORDER — ALPRAZOLAM 0.5 MG/1
TABLET ORAL
Qty: 120 TABLET | Refills: 0 | Status: SHIPPED | OUTPATIENT
Start: 2022-05-26 | End: 2022-06-22

## 2022-06-22 DIAGNOSIS — F41.9 ANXIETY: ICD-10-CM

## 2022-06-22 RX ORDER — ALPRAZOLAM 0.5 MG/1
TABLET ORAL
Qty: 120 TABLET | Refills: 0 | Status: SHIPPED | OUTPATIENT
Start: 2022-06-22 | End: 2022-07-22

## 2022-06-22 NOTE — TELEPHONE ENCOUNTER
Future Appointments    This patient does not currently have any appointments scheduled.     Past Visits    Date Provider Specialty Visit Type Primary Dx   05/25/2022 Zaira Jacob MD Family Medicine Telemedicine Anxiety   05/17/2022 Natacha Stewart, [address] [place] [address] Family Medicine Telemedicine COVID-19

## 2022-07-27 DIAGNOSIS — F41.9 ANXIETY: ICD-10-CM

## 2022-07-27 RX ORDER — ALPRAZOLAM 0.5 MG/1
TABLET ORAL
Qty: 120 TABLET | Refills: 0 | Status: SHIPPED | OUTPATIENT
Start: 2022-07-27 | End: 2022-08-25

## 2022-08-24 DIAGNOSIS — F41.9 ANXIETY: ICD-10-CM

## 2022-08-25 RX ORDER — ALPRAZOLAM 0.5 MG/1
TABLET ORAL
Qty: 120 TABLET | Refills: 0 | Status: SHIPPED | OUTPATIENT
Start: 2022-08-25 | End: 2022-09-21

## 2022-08-26 RX ORDER — GABAPENTIN 600 MG/1
TABLET ORAL
Qty: 180 TABLET | Refills: 2 | Status: SHIPPED | OUTPATIENT
Start: 2022-08-26 | End: 2022-11-24

## 2022-09-20 DIAGNOSIS — F41.9 ANXIETY: ICD-10-CM

## 2022-09-20 NOTE — TELEPHONE ENCOUNTER
Future Appointments    Encounter Information    Provider Department Appt Notes   11/1/2022 Ramon Morales MD [facility] Primary Care 6 mos     Past Visits    Date Provider Specialty Visit Type Primary Dx   05/25/2022 Ramon Morales MD Family Medicine Telemedicine Anxiety

## 2022-09-21 RX ORDER — ALPRAZOLAM 0.5 MG/1
TABLET ORAL
Qty: 120 TABLET | Refills: 0 | Status: SHIPPED | OUTPATIENT
Start: 2022-09-21 | End: 2022-10-14

## 2022-10-14 DIAGNOSIS — F41.9 ANXIETY: ICD-10-CM

## 2022-10-14 RX ORDER — ALPRAZOLAM 0.5 MG/1
TABLET ORAL
Qty: 120 TABLET | Refills: 0 | Status: SHIPPED | OUTPATIENT
Start: 2022-10-14 | End: 2022-11-14

## 2022-10-14 NOTE — TELEPHONE ENCOUNTER
Future Appointments    Encounter Information    Provider Department Appt Notes   11/1/2022 Yasmany Liu MD [facility] Primary Care 6 mos     Past Visits    Date Provider Specialty Visit Type Primary Dx   05/25/2022 Yasmany Liu MD Family Medicine Telemedicine Anxiety

## 2022-10-25 DIAGNOSIS — N13.8 BPH WITH OBSTRUCTION/LOWER URINARY TRACT SYMPTOMS: Primary | ICD-10-CM

## 2022-10-25 DIAGNOSIS — N40.1 BPH WITH OBSTRUCTION/LOWER URINARY TRACT SYMPTOMS: Primary | ICD-10-CM

## 2022-10-27 RX ORDER — TAMSULOSIN HYDROCHLORIDE 0.4 MG/1
0.4 CAPSULE ORAL 2 TIMES DAILY
Qty: 180 CAPSULE | Refills: 3 | Status: SHIPPED | OUTPATIENT
Start: 2022-10-27

## 2022-11-01 ENCOUNTER — OFFICE VISIT (OUTPATIENT)
Dept: FAMILY MEDICINE CLINIC | Age: 62
End: 2022-11-01
Payer: COMMERCIAL

## 2022-11-01 VITALS
DIASTOLIC BLOOD PRESSURE: 70 MMHG | HEART RATE: 78 BPM | TEMPERATURE: 96.9 F | BODY MASS INDEX: 23.24 KG/M2 | HEIGHT: 71 IN | WEIGHT: 166 LBS | SYSTOLIC BLOOD PRESSURE: 106 MMHG | OXYGEN SATURATION: 97 %

## 2022-11-01 DIAGNOSIS — Z79.899 LONG TERM PRESCRIPTION BENZODIAZEPINE USE: ICD-10-CM

## 2022-11-01 DIAGNOSIS — I42.9 CARDIOMYOPATHY, UNSPECIFIED TYPE (HCC): ICD-10-CM

## 2022-11-01 DIAGNOSIS — E78.5 HYPERLIPIDEMIA, UNSPECIFIED HYPERLIPIDEMIA TYPE: ICD-10-CM

## 2022-11-01 DIAGNOSIS — F41.9 ANXIETY: ICD-10-CM

## 2022-11-01 DIAGNOSIS — Z12.5 SCREENING PSA (PROSTATE SPECIFIC ANTIGEN): ICD-10-CM

## 2022-11-01 DIAGNOSIS — Z23 NEEDS FLU SHOT: ICD-10-CM

## 2022-11-01 DIAGNOSIS — I25.119 CORONARY ARTERY DISEASE INVOLVING NATIVE HEART WITH ANGINA PECTORIS, UNSPECIFIED VESSEL OR LESION TYPE (HCC): Primary | ICD-10-CM

## 2022-11-01 DIAGNOSIS — R56.9 CONVULSIONS, UNSPECIFIED CONVULSION TYPE (HCC): ICD-10-CM

## 2022-11-01 LAB
ALBUMIN SERPL-MCNC: 4.1 G/DL (ref 3.5–4.6)
ALP BLD-CCNC: 70 U/L (ref 35–104)
ALT SERPL-CCNC: 13 U/L (ref 0–41)
ANION GAP SERPL CALCULATED.3IONS-SCNC: 8 MEQ/L (ref 9–15)
AST SERPL-CCNC: 19 U/L (ref 0–40)
BILIRUB SERPL-MCNC: 0.3 MG/DL (ref 0.2–0.7)
BUN BLDV-MCNC: 10 MG/DL (ref 8–23)
CALCIUM SERPL-MCNC: 9.3 MG/DL (ref 8.5–9.9)
CHLORIDE BLD-SCNC: 104 MEQ/L (ref 95–107)
CHOLESTEROL, TOTAL: 242 MG/DL (ref 0–199)
CO2: 30 MEQ/L (ref 20–31)
CREAT SERPL-MCNC: 0.8 MG/DL (ref 0.7–1.2)
GFR SERPL CREATININE-BSD FRML MDRD: >60 ML/MIN/{1.73_M2}
GLOBULIN: 2.6 G/DL (ref 2.3–3.5)
GLUCOSE BLD-MCNC: 76 MG/DL (ref 70–99)
HCT VFR BLD CALC: 42.1 % (ref 42–52)
HDLC SERPL-MCNC: 75 MG/DL (ref 40–59)
HEMOGLOBIN: 13.8 G/DL (ref 14–18)
LDL CHOLESTEROL CALCULATED: 149 MG/DL (ref 0–129)
MCH RBC QN AUTO: 33.8 PG (ref 27–31.3)
MCHC RBC AUTO-ENTMCNC: 32.8 % (ref 33–37)
MCV RBC AUTO: 103.2 FL (ref 79–92.2)
PDW BLD-RTO: 13.2 % (ref 11.5–14.5)
PLATELET # BLD: 220 K/UL (ref 130–400)
POTASSIUM SERPL-SCNC: 4.4 MEQ/L (ref 3.4–4.9)
PROSTATE SPECIFIC ANTIGEN: 2.57 NG/ML (ref 0–4)
RBC # BLD: 4.08 M/UL (ref 4.7–6.1)
SODIUM BLD-SCNC: 142 MEQ/L (ref 135–144)
TOTAL PROTEIN: 6.7 G/DL (ref 6.3–8)
TRIGL SERPL-MCNC: 89 MG/DL (ref 0–150)
TSH SERPL DL<=0.05 MIU/L-ACNC: 2.64 UIU/ML (ref 0.44–3.86)
WBC # BLD: 3.9 K/UL (ref 4.8–10.8)

## 2022-11-01 PROCEDURE — 99214 OFFICE O/P EST MOD 30 MIN: CPT | Performed by: FAMILY MEDICINE

## 2022-11-01 PROCEDURE — 90674 CCIIV4 VAC NO PRSV 0.5 ML IM: CPT | Performed by: FAMILY MEDICINE

## 2022-11-01 PROCEDURE — G8482 FLU IMMUNIZE ORDER/ADMIN: HCPCS | Performed by: FAMILY MEDICINE

## 2022-11-01 PROCEDURE — 1036F TOBACCO NON-USER: CPT | Performed by: FAMILY MEDICINE

## 2022-11-01 PROCEDURE — G8420 CALC BMI NORM PARAMETERS: HCPCS | Performed by: FAMILY MEDICINE

## 2022-11-01 PROCEDURE — G8427 DOCREV CUR MEDS BY ELIG CLIN: HCPCS | Performed by: FAMILY MEDICINE

## 2022-11-01 PROCEDURE — G0008 ADMIN INFLUENZA VIRUS VAC: HCPCS | Performed by: FAMILY MEDICINE

## 2022-11-01 PROCEDURE — 3017F COLORECTAL CA SCREEN DOC REV: CPT | Performed by: FAMILY MEDICINE

## 2022-11-01 SDOH — ECONOMIC STABILITY: FOOD INSECURITY: WITHIN THE PAST 12 MONTHS, YOU WORRIED THAT YOUR FOOD WOULD RUN OUT BEFORE YOU GOT MONEY TO BUY MORE.: NEVER TRUE

## 2022-11-01 SDOH — ECONOMIC STABILITY: TRANSPORTATION INSECURITY
IN THE PAST 12 MONTHS, HAS THE LACK OF TRANSPORTATION KEPT YOU FROM MEDICAL APPOINTMENTS OR FROM GETTING MEDICATIONS?: NO

## 2022-11-01 SDOH — ECONOMIC STABILITY: TRANSPORTATION INSECURITY
IN THE PAST 12 MONTHS, HAS LACK OF TRANSPORTATION KEPT YOU FROM MEETINGS, WORK, OR FROM GETTING THINGS NEEDED FOR DAILY LIVING?: NO

## 2022-11-01 SDOH — ECONOMIC STABILITY: FOOD INSECURITY: WITHIN THE PAST 12 MONTHS, THE FOOD YOU BOUGHT JUST DIDN'T LAST AND YOU DIDN'T HAVE MONEY TO GET MORE.: NEVER TRUE

## 2022-11-01 ASSESSMENT — SOCIAL DETERMINANTS OF HEALTH (SDOH): HOW HARD IS IT FOR YOU TO PAY FOR THE VERY BASICS LIKE FOOD, HOUSING, MEDICAL CARE, AND HEATING?: NOT HARD AT ALL

## 2022-11-01 NOTE — PROGRESS NOTES
Chief Complaint   Patient presents with    Anxiety     6 month        HPI:  Jesús Shultz is a 57 y.o. male    Checkup/review chronic issues    Has had covid vaccine   Asks about booster     He has a pacemaker  Nonischemic cardiomyopathy  Follows with cardiology  Dr. Giles Anglin  Pacemaker checks   Has had virtual visits      Hx substance abuse  Was taken off all controlled substances at one time  Xanax was restarted   Takes 4 xanax/day chronically     Controlled Substances Monitoring:     RX Monitoring 11/4/2021   Attestation -   Periodic Controlled Substance Monitoring Possible medication side effects, risk of tolerance/dependence & alternative treatments discussed. ;No signs of potential drug abuse or diversion identified. IBS history  miralax daily has been good for him    Patient Active Problem List   Diagnosis    Generalized pain    High risk medication use    IBS (irritable bowel syndrome)    Anxiety    Kidney disease    Anemia    Diverticulosis    Peptic ulcer disease    Coronary artery disease involving native heart with angina pectoris (HCC)    Substance abuse (HCC)    Chronic back pain    Chemical dependency ([address]    Cardiomyopathy ([facility]    Hyperlipidemia    Ventricular tachycardia    Convulsions (HCC)       Current Outpatient Medications   Medication Sig Dispense Refill    tamsulosin (FLOMAX) 0.4 MG capsule Take 1 capsule by mouth 2 times daily 180 capsule 3    ALPRAZolam (XANAX) 0.5 MG tablet take 1 to 2 tablets by mouth three times a day 120 tablet 0    gabapentin (NEURONTIN) 600 MG tablet take 1 tablet by mouth six times a day 180 tablet 2    methylPREDNISolone (MEDROL DOSEPACK) 4 MG tablet Take by mouth. 1 kit 0    tamsulosin (FLOMAX) 0.4 MG capsule Take 1 capsule by mouth 2 times daily 180 capsule 3    desonide (DESOWEN) 0.05 % cream APPLY TO THE AFFECTED AREA TWICE A DAY 15 g 3    carvedilol (COREG) 12.5 MG tablet TAKE 1 TABLET BY MOUTH TWICE DAILY.       pantoprazole (PROTONIX) 40 MG tablet take 1 tablet by mouth once daily      lisinopril (PRINIVIL;ZESTRIL) 2.5 MG tablet Take 2.5 mg by mouth daily. No current facility-administered medications for this visit. Patient's medications, allergies, past medical, surgical, social and family histories were reviewed and updated as appropriate. Review of Systems:   General ROS: negative for - chills, fatigue, fever, malaise, weight gain or weight loss  Respiratory ROS: no cough, shortness of breath, or wheezing  Cardiovascular ROS: no chest pain or dyspnea on exertion  Gastrointestinal ROS: constant bowel cramping, constipation  Genito-Urinary ROS: no dysuria, trouble voiding  Musculoskeletal ROS: chronic back pain    In general patient otherwise reports feeling well. Physical Exam:  /70 (Site: Left Upper Arm)   Pulse 78   Temp 96.9 °F (36.1 °C)   Ht 5' 11\" (1.803 m)   Wt 166 lb (75.3 kg)   SpO2 97%   BMI 23.15 kg/m²     Gen: Well, NAD, Alert, Oriented x 3   HEENT: EOMI, eyes clear, MMM  Skin: no rash or jaundice  Neck: no significant lymphadenopathy or thyromegaly  Lungs: CTA B w/out Rales/Wheezes/Rhonchi, Good respiratory effort   Heart: RRR, S1S2, w/out M/R/G, non-displaced PMI   Abdomen: Soft NT/ND, w/out R/G, w/ +BSx4   Ext: No C/C/E Bilaterally. Neuro: Neurovascularly intact w/ Sensory/Motor intact UE/LE Bilaterally. Psych: calm/euthymic    Lab Results   Component Value Date    WBC 3.4 (L) 06/22/2021    HGB 14.3 06/22/2021    HCT 42.9 06/22/2021     06/22/2021    CHOL 219 (H) 06/22/2021    TRIG 60 06/22/2021     (H) 06/22/2021    ALT 47 (H) 06/22/2021    AST 51 (H) 06/22/2021     06/22/2021    K 4.6 06/22/2021     06/22/2021    CREATININE 0.87 06/22/2021    BUN 12 06/22/2021    CO2 29 06/22/2021    TSH 3.450 06/22/2021    PSA 3.86 11/04/2021    INR 1.2 07/17/2013    LABA1C 4.9 09/03/2020         A&P   Diagnosis Orders   1.  Coronary artery disease involving native heart with angina pectoris, unspecified vessel or lesion type ([facility]        2. Convulsions, unspecified convulsion type ([facility]        3. Needs flu shot  Influenza, FLUCELVAX, (age 10 mo+), IM, Preservative Free, 0.5 mL      4. Anxiety  TSH      5. Cardiomyopathy, unspecified type ([facility]  Comprehensive Metabolic Panel    CBC      6. Long term prescription benzodiazepine use        7. Hyperlipidemia, unspecified hyperlipidemia type  Lipid Panel      8.  Screening PSA (prostate specific antigen)  PSA Screening          bloodwork  Flu shot     OARRS with xanax fills  Mood has been ok    Chronic conditions are stable  Continue current regimen  Follow up with appropriate specialists and here routinely for ongoing monitoring of chronic conditions        Oli Dumont MD

## 2022-11-01 NOTE — PROGRESS NOTES
After obtaining consent, and per orders of Dr. Ravin Perez, injection of flu given in Left deltoid by Emily Tolliver CMA (AAMA). Patient instructed to remain in clinic for 20 minutes afterwards, and to report any adverse reaction to me immediately. Vaccine Information Sheet, \"Influenza - Inactivated\"  given to Jayla Arnett, or parent/legal guardian of  Jayla Arnett and verbalized understanding. Patient responses:    Have you ever had a reaction to a flu vaccine? No  Are you able to eat eggs without adverse effects? Yes  Do you have any current illness? No  Have you ever had Guillian Buford Syndrome? No    Flu vaccine given per order. Please see immunization tab.

## 2022-11-06 DIAGNOSIS — F41.9 ANXIETY: ICD-10-CM

## 2022-11-07 NOTE — TELEPHONE ENCOUNTER
Return in about 6 months (around 5/1/2023).      Past Visits    Date Provider Specialty Visit Type Primary Dx   11/01/2022 Toy Hernandez MD Family Medicine Office Visit Coronary artery disease involving native heart with angina pectoris, unspecified vessel or lesion type [facility])   05/25/2022 Toy Hernandez MD Family Medicine Telemedicine Anxiety   05/17/2022 ELINA Clark - CNP Family Medicine Telemedicine COVID-19   11/09/2021 Luis Manuel Solo MD Urology Office Visit BPH with obstruction/lower urinary tract symptoms   11/04/2021 Toy Hernandez MD Family Medicine Office Visit Anxiety

## 2022-11-08 RX ORDER — ALPRAZOLAM 0.5 MG/1
TABLET ORAL
Qty: 120 TABLET | Refills: 0 | Status: SHIPPED | OUTPATIENT
Start: 2022-11-08 | End: 2022-11-28

## 2022-11-21 RX ORDER — GABAPENTIN 600 MG/1
TABLET ORAL
Qty: 180 TABLET | Refills: 2 | Status: SHIPPED | OUTPATIENT
Start: 2022-11-21 | End: 2023-02-19

## 2022-11-21 RX ORDER — DESONIDE 0.5 MG/G
CREAM TOPICAL
Qty: 15 G | Refills: 3 | Status: SHIPPED | OUTPATIENT
Start: 2022-11-21

## 2022-11-21 NOTE — TELEPHONE ENCOUNTER
Future Appointments    Encounter Information    Provider Department Appt Notes   5/1/2023 Modesto Metcalf MD [facility] Primary Care Return in about 6 months (around 5/1/2023).      Past Visits    Date Provider Specialty Visit Type Primary Dx   11/01/2022 Modesto Metcalf MD Family Medicine Office Visit Coronary artery disease involving native heart with angina pectoris, unspecified vessel or lesion type [facility])   05/25/2022 Modesto Metcalf MD Family Medicine Telemedicine Anxiety   LM to please cecelia

## 2022-11-27 DIAGNOSIS — F41.9 ANXIETY: ICD-10-CM

## 2022-11-27 NOTE — TELEPHONE ENCOUNTER
Medication Refill  (Newest Message First)  Jim, Surescripts In routed conversation to Rodrick Flynn Pcp Clinical Staff 2 hours ago (9:42 AM)      Future Appointments    Encounter Information    Provider Department Appt Notes   5/1/2023 Brisa Gibson MD [facility] Primary Care Return in about 6 months (around 5/1/2023).      Past Visits    Date Provider Specialty Visit Type Primary Dx   11/01/2022 Brisa Gibson MD Family Medicine Office Visit Coronary artery disease involving native heart with angina pectoris, unspecified vessel or lesion type [facility])   05/25/2022 Brisa Gibson MD

## 2022-11-28 RX ORDER — ALPRAZOLAM 0.5 MG/1
TABLET ORAL
Qty: 120 TABLET | Refills: 0 | Status: SHIPPED | OUTPATIENT
Start: 2022-11-28 | End: 2022-12-28

## 2022-12-16 DIAGNOSIS — F41.9 ANXIETY: ICD-10-CM

## 2022-12-16 RX ORDER — ALPRAZOLAM 0.5 MG/1
TABLET ORAL
Qty: 120 TABLET | Refills: 0 | Status: SHIPPED | OUTPATIENT
Start: 2022-12-16 | End: 2023-01-16

## 2022-12-16 NOTE — TELEPHONE ENCOUNTER
Last Office Visit (last PCP visit):   11/1/2022    Next Visit Date:  Future Appointments   Date Time Provider Yareli Brownlee   5/1/2023  8:15 AM Juliette Ruiz MD [facility] AT [place]       **If hasn't been seen in over a year OR hasn't followed up according to last diabetes/ADHD visit, make appointment for patient before sending refill to provider.     Rx requested:  Requested Prescriptions     Pending Prescriptions Disp Refills    ALPRAZolam (XANAX) 0.5 MG tablet [Pharmacy Med Name: ALPRAZOLAM 0.5 MG TABLET] 120 tablet      Sig: take 1 to 2 tablets by mouth three times a day

## 2023-01-05 DIAGNOSIS — F41.9 ANXIETY: ICD-10-CM

## 2023-01-05 RX ORDER — ALPRAZOLAM 0.5 MG/1
TABLET ORAL
Qty: 120 TABLET | Refills: 0 | Status: SHIPPED | OUTPATIENT
Start: 2023-01-05 | End: 2023-02-05

## 2023-01-05 NOTE — TELEPHONE ENCOUNTER
2018 - F [phone]          Medication Refill  (Newest Message First)  Jim, Surescripts In routed conversation to Rodrick Flynn Pcp Clinical Staff 34 minutes ago (7:33 AM)      Future Appointments    Encounter Information    Provider Department Appt Notes   5/1/2023 Yamila Johnson MD [facility] [place] Primary Care Return in about 6 months (around 5/1/2023). Past Visits    Date Provider Specialty Visit Type Primary Dx   11/01/2022 Yamila Johnson MD Family Medicine Office Visit Coronary artery disease involving native heart with angina pectoris, unspecified vessel or lesion type [facility])   05/25/2022 Yamila Johnson MD Family Medicine Telemedicine Anxiety   05/17/2022 ELINA Castro - [facility] Family Medicine Telemedicine COVID-19   11/09/2021 Reid Lozano MD Urology Office Visit BPH with obstruction/lower urinary tract symptoms   11/04/2021 Yamila Johnson MD Family Medicine Office Visit [address] Department does not match this Encounter Department:  Login Department: [facility]  Encounter Department: Rodrick Flynn Pcp  Encounter Provider: Ciera Watts     This information is used for printing, e-prescribing and last encounter SmartLinks. To modify the encounter department or provider, click the Change Encounter Details link below.   Change Encounter Details  Change Encounter Details

## 2023-01-27 DIAGNOSIS — F41.9 ANXIETY: ICD-10-CM

## 2023-01-27 RX ORDER — ALPRAZOLAM 0.5 MG/1
TABLET ORAL
Qty: 120 TABLET | Refills: 0 | Status: SHIPPED | OUTPATIENT
Start: 2023-01-27 | End: 2023-02-27

## 2023-01-27 NOTE — TELEPHONE ENCOUNTER
requesting medication refill.  Please approve or deny this request.    Rx requested:  Requested Prescriptions     Pending Prescriptions Disp Refills    ALPRAZolam (XANAX) 0.5 MG tablet [Pharmacy Med Name: ALPRAZOLAM 0.5 MG TABLET] 120 tablet      Sig: take 1 to 2 tablets by mouth three times a day         Last Office Visit:   11/1/2022      Next Visit Date:  Future Appointments   Date Time Provider Yareli Brownlee   5/1/2023  8:15 AM Mary Alice Buchanan MD [facility] EMERGENCY MEDICAL [place] AT [place]

## 2023-02-16 DIAGNOSIS — F41.9 ANXIETY: ICD-10-CM

## 2023-02-16 RX ORDER — ALPRAZOLAM 0.5 MG/1
TABLET ORAL
Qty: 120 TABLET | Refills: 0 | Status: SHIPPED | OUTPATIENT
Start: 2023-02-16 | End: 2023-03-16

## 2023-03-08 DIAGNOSIS — F41.9 ANXIETY: ICD-10-CM

## 2023-03-09 RX ORDER — ALPRAZOLAM 0.5 MG/1
TABLET ORAL
Qty: 120 TABLET | Refills: 0 | Status: SHIPPED | OUTPATIENT
Start: 2023-03-09 | End: 2023-04-09

## 2023-03-09 NOTE — TELEPHONE ENCOUNTER
Comments:     Last Office Visit (last PCP visit):   11/1/2022    Next Visit Date:  Future Appointments   Date Time Provider Yareli Brownlee   5/1/2023  8:15 AM Kenny Cordova MD [facility] AT [place]       **If hasn't been seen in over a year OR hasn't followed up according to last diabetes/ADHD visit, make appointment for patient before sending refill to provider.     Rx requested:  Requested Prescriptions     Pending Prescriptions Disp Refills    ALPRAZolam (XANAX) 0.5 MG tablet [Pharmacy Med Name: ALPRAZOLAM 0.5 MG TABLET] 120 tablet      Sig: take 1 to 2 tablets by mouth three times a day

## 2023-03-10 RX ORDER — GABAPENTIN 600 MG/1
TABLET ORAL
Qty: 180 TABLET | Refills: 2 | Status: SHIPPED | OUTPATIENT
Start: 2023-03-10 | End: 2023-06-08

## 2023-03-10 NOTE — TELEPHONE ENCOUNTER
Comments:     Last Office Visit (last PCP visit):   11/1/2022    Next Visit Date:  Future Appointments   Date Time Provider Department Center   5/1/2023  8:15 AM Zach Turcios MD [facility] [place]       **If hasn't been seen in over a year OR hasn't followed up according to last diabetes/ADHD visit, make appointment for patient before sending refill to provider.    Rx requested:  Requested Prescriptions     Pending Prescriptions Disp Refills    gabapentin (NEURONTIN) 600 MG tablet [Pharmacy Med Name: GABAPENTIN 600 MG TABLET] 180 tablet 2     Sig: take 1 tablet by mouth six times a day

## 2023-04-07 DIAGNOSIS — F41.9 ANXIETY: ICD-10-CM

## 2023-04-07 RX ORDER — ALPRAZOLAM 0.5 MG/1
TABLET ORAL
Qty: 120 TABLET | Refills: 0 | Status: SHIPPED | OUTPATIENT
Start: 2023-04-07 | End: 2023-05-07

## 2023-04-07 NOTE — TELEPHONE ENCOUNTER
Comments:     Last Office Visit (last PCP visit):   11/1/2022    Next Visit Date:  Future Appointments   Date Time Provider Yareli Brownlee   5/1/2023  8:15 AM Marian Sigala MD [facility] AT [place]       **If hasn't been seen in over a year OR hasn't followed up according to last diabetes/ADHD visit, make appointment for patient before sending refill to provider.     Rx requested:  Requested Prescriptions     Pending Prescriptions Disp Refills    ALPRAZolam (XANAX) 0.5 MG tablet [Pharmacy Med Name: ALPRAZOLAM 0.5 MG TABLET] 120 tablet      Sig: take 1 to 2 tablets by mouth three times a day

## 2023-04-28 DIAGNOSIS — F41.9 ANXIETY: ICD-10-CM

## 2023-04-28 RX ORDER — ALPRAZOLAM 0.5 MG/1
TABLET ORAL
Qty: 120 TABLET | Refills: 0 | Status: SHIPPED | OUTPATIENT
Start: 2023-04-28 | End: 2023-05-28

## 2023-04-28 NOTE — TELEPHONE ENCOUNTER
Future Appointments    Encounter Information    Provider Department Appt Notes   5/1/2023 Tyler Cardoso MD [facility] Primary Care Return in about 6 months (around 5/1/2023).      Past Visits    Date Provider Specialty Visit Type Primary Dx   11/01/2022 Tyler Cardoso MD Family Medicine Office Visit Coronary artery disease involving native heart with angina pectoris, unspecified vessel or lesion type [facility])   05/25/2022 Tyler Cardoso MD Family Medicine Telemedicine Anxiety

## 2023-05-01 ENCOUNTER — OFFICE VISIT (OUTPATIENT)
Dept: FAMILY MEDICINE CLINIC | Age: 63
End: 2023-05-01
Payer: MEDICARE

## 2023-05-01 VITALS
TEMPERATURE: 97.7 F | WEIGHT: 170.8 LBS | BODY MASS INDEX: 23.91 KG/M2 | SYSTOLIC BLOOD PRESSURE: 124 MMHG | HEIGHT: 71 IN | OXYGEN SATURATION: 96 % | HEART RATE: 75 BPM | DIASTOLIC BLOOD PRESSURE: 70 MMHG

## 2023-05-01 DIAGNOSIS — K58.0 IRRITABLE BOWEL SYNDROME WITH DIARRHEA: Primary | ICD-10-CM

## 2023-05-01 DIAGNOSIS — I47.20 VENTRICULAR TACHYCARDIA (HCC): ICD-10-CM

## 2023-05-01 DIAGNOSIS — R56.9 CONVULSIONS, UNSPECIFIED CONVULSION TYPE (HCC): ICD-10-CM

## 2023-05-01 DIAGNOSIS — I42.9 CARDIOMYOPATHY, UNSPECIFIED TYPE (HCC): ICD-10-CM

## 2023-05-01 DIAGNOSIS — I25.119 CORONARY ARTERY DISEASE INVOLVING NATIVE HEART WITH ANGINA PECTORIS, UNSPECIFIED VESSEL OR LESION TYPE (HCC): ICD-10-CM

## 2023-05-01 DIAGNOSIS — F19.20 CHEMICAL DEPENDENCY (HCC): ICD-10-CM

## 2023-05-01 PROCEDURE — 1036F TOBACCO NON-USER: CPT | Performed by: FAMILY MEDICINE

## 2023-05-01 PROCEDURE — 99214 OFFICE O/P EST MOD 30 MIN: CPT | Performed by: FAMILY MEDICINE

## 2023-05-01 PROCEDURE — G8427 DOCREV CUR MEDS BY ELIG CLIN: HCPCS | Performed by: FAMILY MEDICINE

## 2023-05-01 PROCEDURE — 3017F COLORECTAL CA SCREEN DOC REV: CPT | Performed by: FAMILY MEDICINE

## 2023-05-01 PROCEDURE — G8420 CALC BMI NORM PARAMETERS: HCPCS | Performed by: FAMILY MEDICINE

## 2023-05-01 SDOH — ECONOMIC STABILITY: FOOD INSECURITY: WITHIN THE PAST 12 MONTHS, YOU WORRIED THAT YOUR FOOD WOULD RUN OUT BEFORE YOU GOT MONEY TO BUY MORE.: NEVER TRUE

## 2023-05-01 SDOH — ECONOMIC STABILITY: FOOD INSECURITY: WITHIN THE PAST 12 MONTHS, THE FOOD YOU BOUGHT JUST DIDN'T LAST AND YOU DIDN'T HAVE MONEY TO GET MORE.: NEVER TRUE

## 2023-05-01 SDOH — ECONOMIC STABILITY: INCOME INSECURITY: HOW HARD IS IT FOR YOU TO PAY FOR THE VERY BASICS LIKE FOOD, HOUSING, MEDICAL CARE, AND HEATING?: NOT HARD AT ALL

## 2023-05-01 SDOH — ECONOMIC STABILITY: HOUSING INSECURITY
IN THE LAST 12 MONTHS, WAS THERE A TIME WHEN YOU DID NOT HAVE A STEADY PLACE TO SLEEP OR SLEPT IN A SHELTER (INCLUDING NOW)?: NO

## 2023-05-01 ASSESSMENT — PATIENT HEALTH QUESTIONNAIRE - PHQ9
1. LITTLE INTEREST OR PLEASURE IN DOING THINGS: 0
SUM OF ALL RESPONSES TO PHQ QUESTIONS 1-9: 0
SUM OF ALL RESPONSES TO PHQ9 QUESTIONS 1 & 2: 0
SUM OF ALL RESPONSES TO PHQ QUESTIONS 1-9: 0
2. FEELING DOWN, DEPRESSED OR HOPELESS: 0

## 2023-05-01 NOTE — PROGRESS NOTES
by mouth 2 times daily 180 capsule 3    carvedilol (COREG) 12.5 MG tablet TAKE 1 TABLET BY MOUTH TWICE DAILY. pantoprazole (PROTONIX) 40 MG tablet take 1 tablet by mouth once daily      lisinopril (PRINIVIL;ZESTRIL) 2.5 MG tablet Take 1 tablet by mouth daily       No current facility-administered medications for this visit. Patient's medications, allergies, past medical, surgical, social and family histories were reviewed and updated as appropriate. Review of Systems:   General ROS: negative for - chills, fatigue, fever, malaise, weight gain or weight loss  Respiratory ROS: no cough, shortness of breath, or wheezing  Cardiovascular ROS: no chest pain or dyspnea on exertion  Gastrointestinal ROS: constant bowel cramping, constipation  Genito-Urinary ROS: no dysuria, trouble voiding  Musculoskeletal ROS: chronic back pain    In general patient otherwise reports feeling well. Physical Exam:  /70 (Site: Right Upper Arm)   Pulse 75   Temp 97.7 °F (36.5 °C)   Ht 5' 11\" (1.803 m)   Wt 170 lb 12.8 oz (77.5 kg)   SpO2 96%   BMI 23.82 kg/m²     Gen: Well, NAD, Alert, Oriented x 3   HEENT: EOMI, eyes clear, MMM  Skin: no rash or jaundice  Neck: no significant lymphadenopathy or thyromegaly  Lungs: CTA B w/out Rales/Wheezes/Rhonchi, Good respiratory effort   Heart: RRR, S1S2, w/out M/R/G, non-displaced PMI   Abdomen: Soft NT/ND, w/out R/G, w/ +BSx4   Ext: No C/C/E Bilaterally. Neuro: Neurovascularly intact w/ Sensory/Motor intact UE/LE Bilaterally.   Psych: calm/euthymic    Lab Results   Component Value Date    WBC 3.9 (L) 11/01/2022    HGB 13.8 (L) 11/01/2022    HCT 42.1 11/01/2022     11/01/2022    CHOL 242 (H) 11/01/2022    TRIG 89 11/01/2022    HDL 75 (H) 11/01/2022    ALT 13 11/01/2022    AST 19 11/01/2022     11/01/2022    K 4.4 11/01/2022     11/01/2022    CREATININE 0.80 11/01/2022    BUN 10 11/01/2022    CO2 30 11/01/2022    TSH 2.640 11/01/2022    PSA 2.57 11/01/2022

## 2023-05-19 DIAGNOSIS — F41.9 ANXIETY: ICD-10-CM

## 2023-05-19 RX ORDER — ALPRAZOLAM 0.5 MG/1
TABLET ORAL
Qty: 120 TABLET | Refills: 0 | Status: SHIPPED | OUTPATIENT
Start: 2023-05-19 | End: 2023-06-19

## 2023-05-19 NOTE — TELEPHONE ENCOUNTER
Comments:     Last Office Visit (last PCP visit):   5/1/2023    Next Visit Date:  Future Appointments   Date Time Provider Yareli Brownlee   11/2/2023  8:00 AM Harry Edmondson MD [facility] AT [place]       **If hasn't been seen in over a year OR hasn't followed up according to last diabetes/ADHD visit, make appointment for patient before sending refill to provider.     Rx requested:  Requested Prescriptions     Pending Prescriptions Disp Refills    ALPRAZolam (XANAX) 0.5 MG tablet [Pharmacy Med Name: ALPRAZOLAM 0.5 MG TABLET] 120 tablet      Sig: take 1 to 2 tablets by mouth three times a day

## 2023-05-31 DIAGNOSIS — F41.9 ANXIETY: ICD-10-CM

## 2023-06-01 RX ORDER — ALPRAZOLAM 0.5 MG/1
TABLET ORAL
Qty: 120 TABLET | Refills: 0 | Status: SHIPPED | OUTPATIENT
Start: 2023-06-01 | End: 2023-07-01

## 2023-06-01 NOTE — TELEPHONE ENCOUNTER
Comments:     Last Office Visit (last PCP visit):   5/1/2023    Next Visit Date:  Future Appointments   Date Time Provider Yareli Brownlee   11/2/2023  8:00 AM Shannan Garcia MD [facility] AT [place]       **If hasn't been seen in over a year OR hasn't followed up according to last diabetes/ADHD visit, make appointment for patient before sending refill to provider.     Rx requested:  Requested Prescriptions     Pending Prescriptions Disp Refills    ALPRAZolam (XANAX) 0.5 MG tablet [Pharmacy Med Name: ALPRAZOLAM 0.5 MG TABLET] 120 tablet      Sig: take 1 to 2 tablets by mouth three times a day

## 2023-07-01 DIAGNOSIS — F41.9 ANXIETY: ICD-10-CM

## 2023-07-03 RX ORDER — ALPRAZOLAM 0.5 MG/1
TABLET ORAL
Qty: 120 TABLET | Refills: 0 | Status: SHIPPED | OUTPATIENT
Start: 2023-07-03 | End: 2023-08-03

## 2023-07-06 RX ORDER — GABAPENTIN 600 MG/1
TABLET ORAL
Qty: 180 TABLET | Refills: 2 | Status: SHIPPED | OUTPATIENT
Start: 2023-07-06 | End: 2023-10-04

## 2023-07-27 DIAGNOSIS — F41.9 ANXIETY: ICD-10-CM

## 2023-07-28 RX ORDER — ALPRAZOLAM 0.5 MG/1
TABLET ORAL
Qty: 120 TABLET | Refills: 2 | Status: SHIPPED | OUTPATIENT
Start: 2023-07-28 | End: 2023-10-28

## 2023-07-28 NOTE — TELEPHONE ENCOUNTER
Requesting medication refill.  Please approve or deny this request.    Rx requested:  Requested Prescriptions     Pending Prescriptions Disp Refills    ALPRAZolam (XANAX) 0.5 MG tablet [Pharmacy Med Name: ALPRAZOLAM 0.5 MG TABLET] 120 tablet      Sig: take 1 to 2 tablets by mouth three times a day       Last Office Visit:   5/1/2023    Next Visit Date:  Future Appointments   Date Time Provider [address] [place] [address]   11/2/2023  8:00 AM Colton Mercado MD [facility] EMERGENCY MEDICAL [place] AT [place]

## 2023-08-15 ENCOUNTER — TELEPHONE (OUTPATIENT)
Dept: FAMILY MEDICINE CLINIC | Age: 63
End: 2023-08-15

## 2023-10-03 DIAGNOSIS — F41.9 ANXIETY: ICD-10-CM

## 2023-10-03 RX ORDER — ALPRAZOLAM 0.5 MG/1
TABLET ORAL
Qty: 120 TABLET | Refills: 2 | Status: SHIPPED | OUTPATIENT
Start: 2023-10-03 | End: 2024-01-03

## 2023-10-03 RX ORDER — GABAPENTIN 600 MG/1
600 TABLET ORAL EVERY 4 HOURS
Qty: 180 TABLET | Refills: 2 | Status: SHIPPED | OUTPATIENT
Start: 2023-10-03 | End: 2024-01-01

## 2023-10-04 DIAGNOSIS — N40.1 BPH WITH OBSTRUCTION/LOWER URINARY TRACT SYMPTOMS: Primary | ICD-10-CM

## 2023-10-04 DIAGNOSIS — N13.8 BPH WITH OBSTRUCTION/LOWER URINARY TRACT SYMPTOMS: Primary | ICD-10-CM

## 2023-10-04 RX ORDER — TAMSULOSIN HYDROCHLORIDE 0.4 MG/1
0.4 CAPSULE ORAL 2 TIMES DAILY
Qty: 180 CAPSULE | Refills: 2 | Status: SHIPPED | OUTPATIENT
Start: 2023-10-04

## 2023-10-05 PROBLEM — I47.10 SVT (SUPRAVENTRICULAR TACHYCARDIA) (CMS-HCC): Status: ACTIVE | Noted: 2023-10-05

## 2023-10-05 PROBLEM — I10 HYPERTENSION: Status: ACTIVE | Noted: 2023-10-05

## 2023-10-05 PROBLEM — Z91.199 NONCOMPLIANCE WITH DIAGNOSTIC TESTING: Status: ACTIVE | Noted: 2023-10-05

## 2023-10-05 PROBLEM — M54.9 BACK PAIN: Status: ACTIVE | Noted: 2023-10-05

## 2023-10-05 PROBLEM — I42.8 NONISCHEMIC CARDIOMYOPATHY (MULTI): Status: ACTIVE | Noted: 2023-10-05

## 2023-10-05 PROBLEM — R55 FAINTING: Status: ACTIVE | Noted: 2023-10-05

## 2023-10-05 PROBLEM — I49.01 VENTRICULAR FIBRILLATION (MULTI): Status: ACTIVE | Noted: 2023-10-05

## 2023-10-05 PROBLEM — T50.905A ADVERSE EFFECT OF DRUG/MEDICINAL: Status: ACTIVE | Noted: 2023-10-05

## 2023-10-05 PROBLEM — F32.A DEPRESSION: Status: ACTIVE | Noted: 2023-10-05

## 2023-10-05 PROBLEM — Z95.810 ICD (IMPLANTABLE CARDIOVERTER-DEFIBRILLATOR), SINGLE, IN SITU: Status: ACTIVE | Noted: 2023-10-05

## 2023-10-05 PROBLEM — I47.20 VENTRICULAR TACHYCARDIA (MULTI): Status: ACTIVE | Noted: 2023-10-05

## 2023-10-05 PROBLEM — N04.0 NEPHROTIC SYNDROME WITH LESION OF MINIMAL CHANGE GLOMERULONEPHRITIS: Status: ACTIVE | Noted: 2023-10-05

## 2023-10-05 PROBLEM — F41.9 ANXIETY DISORDER: Status: ACTIVE | Noted: 2023-10-05

## 2023-10-05 PROBLEM — F11.10: Status: ACTIVE | Noted: 2023-10-05

## 2023-10-05 RX ORDER — MULTIVIT-MIN/FA/LYCOPEN/LUTEIN .4-300-25
1 TABLET ORAL DAILY
COMMUNITY

## 2023-10-05 RX ORDER — TAMSULOSIN HYDROCHLORIDE 0.4 MG/1
1 CAPSULE ORAL 2 TIMES DAILY
COMMUNITY

## 2023-10-05 RX ORDER — CARVEDILOL 12.5 MG/1
1 TABLET ORAL 2 TIMES DAILY
COMMUNITY
End: 2023-10-10 | Stop reason: SDUPTHER

## 2023-10-05 RX ORDER — ALPRAZOLAM 0.5 MG/1
0.25 TABLET ORAL 4 TIMES DAILY
COMMUNITY

## 2023-10-05 RX ORDER — DESONIDE 0.5 MG/G
CREAM TOPICAL 2 TIMES DAILY
COMMUNITY

## 2023-10-05 RX ORDER — LISINOPRIL 2.5 MG/1
1 TABLET ORAL DAILY
COMMUNITY
Start: 2022-01-28 | End: 2023-11-30 | Stop reason: SDUPTHER

## 2023-10-09 ENCOUNTER — HOSPITAL ENCOUNTER (OUTPATIENT)
Dept: CARDIOLOGY | Facility: HOSPITAL | Age: 63
Discharge: HOME | End: 2023-10-09
Payer: MEDICARE

## 2023-10-09 DIAGNOSIS — Z95.810 AICD (AUTOMATIC CARDIOVERTER/DEFIBRILLATOR) PRESENT: ICD-10-CM

## 2023-10-09 DIAGNOSIS — I47.29 VENTRICULAR TACHYCARDIA (PAROXYSMAL) (MULTI): ICD-10-CM

## 2023-10-09 PROCEDURE — 93295 DEV INTERROG REMOTE 1/2/MLT: CPT | Performed by: INTERNAL MEDICINE

## 2023-10-09 PROCEDURE — 93296 REM INTERROG EVL PM/IDS: CPT

## 2023-10-10 ENCOUNTER — TELEPHONE (OUTPATIENT)
Dept: CARDIOLOGY | Facility: CLINIC | Age: 63
End: 2023-10-10
Payer: MEDICARE

## 2023-10-10 DIAGNOSIS — I10 HYPERTENSION, UNSPECIFIED TYPE: Primary | ICD-10-CM

## 2023-10-11 RX ORDER — CARVEDILOL 12.5 MG/1
12.5 TABLET ORAL 2 TIMES DAILY
Qty: 180 TABLET | Refills: 3 | Status: SHIPPED | OUTPATIENT
Start: 2023-10-11

## 2023-10-13 RX ORDER — GABAPENTIN 600 MG/1
600 TABLET ORAL EVERY 4 HOURS
Qty: 180 TABLET | Refills: 2 | Status: SHIPPED | OUTPATIENT
Start: 2023-10-13 | End: 2024-01-11

## 2023-10-16 DIAGNOSIS — F41.9 ANXIETY: ICD-10-CM

## 2023-10-17 RX ORDER — ALPRAZOLAM 0.5 MG/1
TABLET ORAL
Qty: 120 TABLET | Refills: 2 | Status: SHIPPED | OUTPATIENT
Start: 2023-10-17 | End: 2024-01-16

## 2023-10-17 RX ORDER — GABAPENTIN 600 MG/1
600 TABLET ORAL EVERY 4 HOURS
Qty: 180 TABLET | Refills: 2 | Status: SHIPPED | OUTPATIENT
Start: 2023-10-17 | End: 2024-01-15

## 2023-11-02 ENCOUNTER — OFFICE VISIT (OUTPATIENT)
Dept: FAMILY MEDICINE CLINIC | Age: 63
End: 2023-11-02
Payer: MEDICARE

## 2023-11-02 VITALS
WEIGHT: 170.1 LBS | TEMPERATURE: 96.8 F | BODY MASS INDEX: 23.81 KG/M2 | SYSTOLIC BLOOD PRESSURE: 110 MMHG | HEART RATE: 67 BPM | HEIGHT: 71 IN | DIASTOLIC BLOOD PRESSURE: 64 MMHG | OXYGEN SATURATION: 96 %

## 2023-11-02 DIAGNOSIS — I47.20 VENTRICULAR TACHYCARDIA (HCC): ICD-10-CM

## 2023-11-02 DIAGNOSIS — I25.119 CORONARY ARTERY DISEASE INVOLVING NATIVE HEART WITH ANGINA PECTORIS, UNSPECIFIED VESSEL OR LESION TYPE (HCC): ICD-10-CM

## 2023-11-02 DIAGNOSIS — F41.9 ANXIETY: ICD-10-CM

## 2023-11-02 DIAGNOSIS — Z79.899 LONG TERM PRESCRIPTION BENZODIAZEPINE USE: ICD-10-CM

## 2023-11-02 DIAGNOSIS — G89.29 CHRONIC LOW BACK PAIN, UNSPECIFIED BACK PAIN LATERALITY, UNSPECIFIED WHETHER SCIATICA PRESENT: ICD-10-CM

## 2023-11-02 DIAGNOSIS — K58.0 IRRITABLE BOWEL SYNDROME WITH DIARRHEA: ICD-10-CM

## 2023-11-02 DIAGNOSIS — Z12.5 SCREENING PSA (PROSTATE SPECIFIC ANTIGEN): ICD-10-CM

## 2023-11-02 DIAGNOSIS — M54.50 CHRONIC LOW BACK PAIN, UNSPECIFIED BACK PAIN LATERALITY, UNSPECIFIED WHETHER SCIATICA PRESENT: ICD-10-CM

## 2023-11-02 DIAGNOSIS — E78.5 HYPERLIPIDEMIA, UNSPECIFIED HYPERLIPIDEMIA TYPE: ICD-10-CM

## 2023-11-02 DIAGNOSIS — I42.9 CARDIOMYOPATHY, UNSPECIFIED TYPE (HCC): Primary | ICD-10-CM

## 2023-11-02 LAB
ALBUMIN SERPL-MCNC: 4.5 G/DL (ref 3.5–4.6)
ALP SERPL-CCNC: 78 U/L (ref 35–104)
ALT SERPL-CCNC: 8 U/L (ref 0–41)
ANION GAP SERPL CALCULATED.3IONS-SCNC: 9 MEQ/L (ref 9–15)
AST SERPL-CCNC: 17 U/L (ref 0–40)
BILIRUB SERPL-MCNC: 0.4 MG/DL (ref 0.2–0.7)
BUN SERPL-MCNC: 10 MG/DL (ref 8–23)
CALCIUM SERPL-MCNC: 9.7 MG/DL (ref 8.5–9.9)
CHLORIDE SERPL-SCNC: 104 MEQ/L (ref 95–107)
CHOLEST SERPL-MCNC: 251 MG/DL (ref 0–199)
CO2 SERPL-SCNC: 30 MEQ/L (ref 20–31)
CREAT SERPL-MCNC: 0.78 MG/DL (ref 0.7–1.2)
ERYTHROCYTE [DISTWIDTH] IN BLOOD BY AUTOMATED COUNT: 12.2 % (ref 11.5–14.5)
GLOBULIN SER CALC-MCNC: 2.5 G/DL (ref 2.3–3.5)
GLUCOSE SERPL-MCNC: 77 MG/DL (ref 70–99)
HCT VFR BLD AUTO: 42.6 % (ref 42–52)
HDLC SERPL-MCNC: 60 MG/DL (ref 40–59)
HGB BLD-MCNC: 14.2 G/DL (ref 14–18)
LDLC SERPL CALC-MCNC: 171 MG/DL (ref 0–129)
MCH RBC QN AUTO: 32.8 PG (ref 27–31.3)
MCHC RBC AUTO-ENTMCNC: 33.3 % (ref 33–37)
MCV RBC AUTO: 98.4 FL (ref 79–92.2)
PLATELET # BLD AUTO: 225 K/UL (ref 130–400)
POTASSIUM SERPL-SCNC: 4.4 MEQ/L (ref 3.4–4.9)
PROT SERPL-MCNC: 7 G/DL (ref 6.3–8)
PSA SERPL-MCNC: 3.41 NG/ML (ref 0–4)
RBC # BLD AUTO: 4.33 M/UL (ref 4.7–6.1)
SODIUM SERPL-SCNC: 143 MEQ/L (ref 135–144)
TRIGL SERPL-MCNC: 98 MG/DL (ref 0–150)
TSH SERPL-MCNC: 5.11 UIU/ML (ref 0.44–3.86)
WBC # BLD AUTO: 5.1 K/UL (ref 4.8–10.8)

## 2023-11-02 PROCEDURE — G8420 CALC BMI NORM PARAMETERS: HCPCS | Performed by: FAMILY MEDICINE

## 2023-11-02 PROCEDURE — G8484 FLU IMMUNIZE NO ADMIN: HCPCS | Performed by: FAMILY MEDICINE

## 2023-11-02 PROCEDURE — 1123F ACP DISCUSS/DSCN MKR DOCD: CPT | Performed by: FAMILY MEDICINE

## 2023-11-02 PROCEDURE — 3017F COLORECTAL CA SCREEN DOC REV: CPT | Performed by: FAMILY MEDICINE

## 2023-11-02 PROCEDURE — 99214 OFFICE O/P EST MOD 30 MIN: CPT | Performed by: FAMILY MEDICINE

## 2023-11-02 PROCEDURE — 1036F TOBACCO NON-USER: CPT | Performed by: FAMILY MEDICINE

## 2023-11-02 PROCEDURE — G8427 DOCREV CUR MEDS BY ELIG CLIN: HCPCS | Performed by: FAMILY MEDICINE

## 2023-11-02 RX ORDER — METHYLPREDNISOLONE 4 MG/1
TABLET ORAL
Qty: 1 KIT | Refills: 0 | Status: SHIPPED | OUTPATIENT
Start: 2023-11-02

## 2023-11-02 SDOH — ECONOMIC STABILITY: INCOME INSECURITY: HOW HARD IS IT FOR YOU TO PAY FOR THE VERY BASICS LIKE FOOD, HOUSING, MEDICAL CARE, AND HEATING?: NOT HARD AT ALL

## 2023-11-02 SDOH — ECONOMIC STABILITY: FOOD INSECURITY: WITHIN THE PAST 12 MONTHS, YOU WORRIED THAT YOUR FOOD WOULD RUN OUT BEFORE YOU GOT MONEY TO BUY MORE.: NEVER TRUE

## 2023-11-02 SDOH — ECONOMIC STABILITY: FOOD INSECURITY: WITHIN THE PAST 12 MONTHS, THE FOOD YOU BOUGHT JUST DIDN'T LAST AND YOU DIDN'T HAVE MONEY TO GET MORE.: NEVER TRUE

## 2023-11-02 NOTE — PROGRESS NOTES
Coronary artery disease involving native heart with angina pectoris, unspecified vessel or lesion type ([address])        5. Irritable bowel syndrome with diarrhea        6. Long term prescription benzodiazepine use        7. Hyperlipidemia, unspecified hyperlipidemia type        8. Screening PSA (prostate specific antigen)        9.  Chronic low back pain, unspecified back pain laterality, unspecified whether sciatica present  methylPREDNISolone (MEDROL DOSEPACK) 4 MG tablet    XR LUMBAR SPINE (MIN 4 VIEWS)          Will look for PT exercises for his back     Xray  Medrol    He can't do MRI with his current pacemaker       OARRS with xanax fills  Mood has been ok    Chronic conditions are stable  Continue current regimen  Follow up with appropriate specialists and here routinely for ongoing monitoring of chronic conditions        Crystal Campbell MD

## 2023-11-03 DIAGNOSIS — F41.9 ANXIETY: ICD-10-CM

## 2023-11-03 RX ORDER — ALPRAZOLAM 0.5 MG/1
TABLET ORAL
OUTPATIENT
Start: 2023-11-03 | End: 2024-02-02

## 2023-11-03 NOTE — TELEPHONE ENCOUNTER
Comments: pt is asking for a 90 day supply due to ins change. Last Office Visit (last PCP visit):   11/2/2023    Next Visit Date:  Future Appointments   Date Time Provider [address]  [facility]   5/2/2024  9:00 AM Malgorzata Olson MD [facility] AT [place]       **If hasn't been seen in over a year OR hasn't followed up according to last diabetes/ADHD visit, make appointment for patient before sending refill to provider.     Rx requested:  Requested Prescriptions     Pending Prescriptions Disp Refills    ALPRAZolam (XANAX) 0.5 MG tablet 120 tablet 2     Sig: take 1 to 2 tablets by mouth three times a day

## 2023-11-20 DIAGNOSIS — F41.9 ANXIETY: ICD-10-CM

## 2023-11-20 RX ORDER — ALPRAZOLAM 0.5 MG/1
TABLET ORAL
Qty: 120 TABLET | Refills: 2 | Status: SHIPPED | OUTPATIENT
Start: 2023-11-20 | End: 2024-02-19

## 2023-11-20 NOTE — TELEPHONE ENCOUNTER
Comments:     Last Office Visit (last PCP visit):   11/2/2023    Next Visit Date:  Future Appointments   Date Time Provider [address]   5/2/2024  9:00 AM Katie Lee MD [facility] AT [place]       **If hasn't been seen in over a year OR hasn't followed up according to last diabetes/ADHD visit, make appointment for patient before sending refill to provider.     Rx requested:  Requested Prescriptions     Pending Prescriptions Disp Refills    ALPRAZolam (XANAX) 0.5 MG tablet 120 tablet 2     Sig: take 1 to 2 tablets by mouth three times a day Thank you for allowing me to care for you today!!    Take your medications as directed.  Please call your primary care physician during next business day to inform about your emergency room visit.      Please note:  This evaluation only concerns your current health status.  Your health status could change over the next several hours or days.  This means you could feel worse with similar symptoms, or another condition that is not yet observable may develop either from this current concern or develop entirely independently.          SEEK CARE IMMEDIATELY IF:   · You can't retract the foreskin  · You can't return the retracted foreskin to the forward position. This requires immediate attention!  · Your symptoms get worse  · You have partial or complete blockage of the flow of urine

## 2023-11-20 NOTE — TELEPHONE ENCOUNTER
Pt calling asking for lab results  they were done 2 weeks ago. [phone]      Lov 11/2 asking for the script to be for 90 days .  Quantity needs to be changed set for 30 day

## 2023-11-27 ENCOUNTER — TELEPHONE (OUTPATIENT)
Dept: FAMILY MEDICINE CLINIC | Age: 63
End: 2023-11-27

## 2023-11-27 DIAGNOSIS — F41.9 ANXIETY: ICD-10-CM

## 2023-11-27 RX ORDER — ALPRAZOLAM 0.5 MG/1
TABLET ORAL
Qty: 120 TABLET | Refills: 2 | Status: SHIPPED | OUTPATIENT
Start: 2023-11-27 | End: 2024-02-26

## 2023-11-30 DIAGNOSIS — I10 HYPERTENSION, UNSPECIFIED TYPE: ICD-10-CM

## 2023-11-30 RX ORDER — LISINOPRIL 2.5 MG/1
2.5 TABLET ORAL DAILY
Qty: 90 TABLET | Refills: 3 | Status: SHIPPED | OUTPATIENT
Start: 2023-11-30 | End: 2024-11-29

## 2024-01-02 PROBLEM — I25.119 CORONARY ARTERY DISEASE INVOLVING NATIVE HEART WITH ANGINA PECTORIS (CMS-HCC): Status: ACTIVE | Noted: 2021-03-10

## 2024-01-02 PROBLEM — I25.10 ATHEROSCLEROSIS OF CORONARY ARTERY: Status: ACTIVE | Noted: 2020-03-09

## 2024-01-17 ENCOUNTER — HOSPITAL ENCOUNTER (OUTPATIENT)
Dept: CARDIOLOGY | Facility: HOSPITAL | Age: 64
Discharge: HOME | End: 2024-01-17
Payer: MEDICARE

## 2024-01-17 DIAGNOSIS — Z95.810 PRESENCE OF AUTOMATIC CARDIOVERTER/DEFIBRILLATOR (AICD): Primary | ICD-10-CM

## 2024-01-17 DIAGNOSIS — I47.29 PAROXYSMAL VENTRICULAR TACHYCARDIA (MULTI): ICD-10-CM

## 2024-01-17 DIAGNOSIS — Z95.810 PRESENCE OF AUTOMATIC CARDIOVERTER/DEFIBRILLATOR (AICD): ICD-10-CM

## 2024-01-17 PROCEDURE — 93295 DEV INTERROG REMOTE 1/2/MLT: CPT | Performed by: INTERNAL MEDICINE

## 2024-01-17 PROCEDURE — 93296 REM INTERROG EVL PM/IDS: CPT

## 2024-01-17 RX ORDER — DESONIDE 0.5 MG/G
CREAM TOPICAL
Qty: 15 G | Refills: 3 | Status: SHIPPED | OUTPATIENT
Start: 2024-01-17

## 2024-01-17 NOTE — TELEPHONE ENCOUNTER
Future Appointments    Encounter Information   Provider Department Appt Notes   5/2/2024 Zach Turcios MD [facility]     Past Visits    Date Provider Specialty Visit Type Primary Dx   11/02/2023 Zach Turcios MD Family Medicine Office Visit Cardiomyopathy, unspecified type (HCC)   05/01/2023 Zach Turcios MD Family Medicine Office Visit Irritable bowel syndrome with diarrhea

## 2024-02-02 DIAGNOSIS — F41.9 ANXIETY: ICD-10-CM

## 2024-02-02 RX ORDER — ALPRAZOLAM 0.5 MG/1
TABLET ORAL
Qty: 120 TABLET | Refills: 0 | Status: SHIPPED | OUTPATIENT
Start: 2024-02-02 | End: 2024-03-02

## 2024-02-02 NOTE — TELEPHONE ENCOUNTER
Comments: last fill 11/27/23    Last Office Visit (last PCP visit):   11/2/2023    Next Visit Date:  Future Appointments   Date Time Provider Department Center   5/2/2024  9:00 AM Zach Turcios MD [facility] [place]       **If hasn't been seen in over a year OR hasn't followed up according to last diabetes/ADHD visit, make appointment for patient before sending refill to provider.    Rx requested:  Requested Prescriptions     Pending Prescriptions Disp Refills    ALPRAZolam (XANAX) 0.5 MG tablet [Pharmacy Med Name: ALPRAZOLAM 0.5 MG Tablet] 120 tablet      Sig: TAKE 1 TO 2 TABLETS THREE TIMES DAILY

## 2024-02-22 DIAGNOSIS — F41.9 ANXIETY: ICD-10-CM

## 2024-02-22 NOTE — TELEPHONE ENCOUNTER
Comments: last fill 2/2/24    Last Office Visit (last PCP visit):   11/2/2023    Next Visit Date:  Future Appointments   Date Time Provider Department Center   5/2/2024  9:00 AM Zach Turcios MD [facility] [place]       **If hasn't been seen in over a year OR hasn't followed up according to last diabetes/ADHD visit, make appointment for patient before sending refill to provider.    Rx requested:  Requested Prescriptions     Pending Prescriptions Disp Refills    ALPRAZolam (XANAX) 0.5 MG tablet [Pharmacy Med Name: ALPRAZOLAM 0.5 MG Tablet] 120 tablet      Sig: TAKE 1 TO 2 TABLETS THREE TIMES DAILY

## 2024-02-23 RX ORDER — ALPRAZOLAM 0.5 MG/1
TABLET ORAL
Qty: 120 TABLET | Refills: 0 | Status: SHIPPED | OUTPATIENT
Start: 2024-02-23 | End: 2024-03-23

## 2024-02-26 RX ORDER — GABAPENTIN 600 MG/1
TABLET ORAL
Qty: 120 TABLET | Refills: 11 | Status: SHIPPED | OUTPATIENT
Start: 2024-02-26 | End: 2025-02-26

## 2024-02-26 NOTE — TELEPHONE ENCOUNTER
Comments:     Last Office Visit (last PCP visit):   11/2/2023    Next Visit Date:  Future Appointments   Date Time Provider Department Center   5/2/2024  9:00 AM Zach Turcios MD [facility] [place]       **If hasn't been seen in over a year OR hasn't followed up according to last diabetes/ADHD visit, make appointment for patient before sending refill to provider.    Rx requested:  Requested Prescriptions     Pending Prescriptions Disp Refills    gabapentin (NEURONTIN) 600 MG tablet [Pharmacy Med Name: GABAPENTIN 600 MG Tablet] 180 tablet 11     Sig: TAKE 1 TABLET EVERY 4 HOURS

## 2024-03-13 DIAGNOSIS — F41.9 ANXIETY: ICD-10-CM

## 2024-03-13 RX ORDER — ALPRAZOLAM 0.5 MG/1
TABLET ORAL
Qty: 120 TABLET | OUTPATIENT
Start: 2024-03-13

## 2024-03-13 NOTE — TELEPHONE ENCOUNTER
Comments: last fill 2/23/24    Last Office Visit (last PCP visit):   11/2/2023    Next Visit Date:  Future Appointments   Date Time Provider Department Center   5/2/2024  9:00 AM Zach Turcios MD [facility] [place]       **If hasn't been seen in over a year OR hasn't followed up according to last diabetes/ADHD visit, make appointment for patient before sending refill to provider.    Rx requested:  Requested Prescriptions     Pending Prescriptions Disp Refills    ALPRAZolam (XANAX) 0.5 MG tablet [Pharmacy Med Name: ALPRAZOLAM 0.5 MG Tablet] 120 tablet      Sig: TAKE 1 TO 2 TABLETS THREE TIMES DAILY

## 2024-03-14 DIAGNOSIS — F41.9 ANXIETY: ICD-10-CM

## 2024-03-14 RX ORDER — ALPRAZOLAM 0.5 MG/1
TABLET ORAL
Qty: 120 TABLET | OUTPATIENT
Start: 2024-03-14

## 2024-03-14 NOTE — TELEPHONE ENCOUNTER
Comments: last fill 2/23/24    Last Office Visit (last PCP visit):   11/2/2023    Next Visit Date:  Future Appointments   Date Time Provider Department Center   5/2/2024  9:00 AM Zach Turcios MD [facility] [place]       **If hasn't been seen in over a year OR hasn't followed up according to last diabetes/ADHD visit, make appointment for patient before sending refill to provider.    Rx requested:  Requested Prescriptions     Pending Prescriptions Disp Refills    ALPRAZolam (XANAX) 0.5 MG tablet [Pharmacy Med Name: ALPRAZOLAM 0.5 MG Tablet] 120 tablet      Sig: TAKE 1 TO 2 TABLETS THREE TIMES DAILY

## 2024-03-17 DIAGNOSIS — F41.9 ANXIETY: ICD-10-CM

## 2024-03-18 RX ORDER — ALPRAZOLAM 0.5 MG/1
TABLET ORAL
Qty: 120 TABLET | OUTPATIENT
Start: 2024-03-18

## 2024-03-18 NOTE — TELEPHONE ENCOUNTER
Comments: last fill 2/23/24    Last Office Visit (last PCP visit):   11/2/2023    Next Visit Date:  Future Appointments   Date Time Provider Department Center   5/2/2024  9:00 AM Zach Turcios MD [facility] [place]       **If hasn't been seen in over a year OR hasn't followed up according to last diabetes/ADHD visit, make appointment for patient before sending refill to provider.    Rx requested:  Requested Prescriptions     Pending Prescriptions Disp Refills    ALPRAZolam (XANAX) 0.5 MG tablet [Pharmacy Med Name: ALPRAZOLAM 0.5 MG Tablet] 120 tablet      Sig: TAKE 1 TO 2 TABLETS THREE TIMES DAILY

## 2024-03-19 DIAGNOSIS — F41.9 ANXIETY: ICD-10-CM

## 2024-03-20 RX ORDER — ALPRAZOLAM 0.5 MG/1
TABLET ORAL
Qty: 120 TABLET | Refills: 0 | Status: SHIPPED | OUTPATIENT
Start: 2024-03-20 | End: 2024-04-20

## 2024-03-29 ENCOUNTER — APPOINTMENT (OUTPATIENT)
Dept: CARDIOLOGY | Facility: HOSPITAL | Age: 64
End: 2024-03-29
Payer: MEDICARE

## 2024-04-06 DIAGNOSIS — F41.9 ANXIETY: ICD-10-CM

## 2024-04-09 DIAGNOSIS — F41.9 ANXIETY: ICD-10-CM

## 2024-04-09 RX ORDER — ALPRAZOLAM 0.5 MG/1
TABLET ORAL
Qty: 120 TABLET | OUTPATIENT
Start: 2024-04-09

## 2024-04-09 NOTE — TELEPHONE ENCOUNTER
Comments:     Last Office Visit (last PCP visit):   11/2/2023    Next Visit Date:  Future Appointments   Date Time Provider Department Center   5/2/2024  9:00 AM Zach Turcios MD [facility] [place]       **If hasn't been seen in over a year OR hasn't followed up according to last diabetes/ADHD visit, make appointment for patient before sending refill to provider.    Rx requested:  Requested Prescriptions     Pending Prescriptions Disp Refills    ALPRAZolam (XANAX) 0.5 MG tablet [Pharmacy Med Name: ALPRAZOLAM 0.5 MG Tablet] 120 tablet      Sig: TAKE 1 TO 2 TABLETS THREE TIMES DAILY

## 2024-04-11 DIAGNOSIS — F41.9 ANXIETY: ICD-10-CM

## 2024-04-11 RX ORDER — ALPRAZOLAM 0.5 MG/1
TABLET ORAL
Qty: 120 TABLET | OUTPATIENT
Start: 2024-04-11

## 2024-04-11 NOTE — TELEPHONE ENCOUNTER
Comments: last fill 3/20/24    Last Office Visit (last PCP visit):   11/2/2023    Next Visit Date:  Future Appointments   Date Time Provider Department Center   5/2/2024  9:00 AM Zach Turcios MD [facility] [place]       **If hasn't been seen in over a year OR hasn't followed up according to last diabetes/ADHD visit, make appointment for patient before sending refill to provider.    Rx requested:  Requested Prescriptions     Pending Prescriptions Disp Refills    ALPRAZolam (XANAX) 0.5 MG tablet [Pharmacy Med Name: ALPRAZOLAM 0.5 MG Tablet] 120 tablet      Sig: TAKE 1 TO 2 TABLETS THREE TIMES DAILY

## 2024-04-15 RX ORDER — ALPRAZOLAM 0.5 MG/1
TABLET ORAL
Qty: 120 TABLET | Refills: 0 | Status: SHIPPED | OUTPATIENT
Start: 2024-04-15 | End: 2024-05-15

## 2024-04-15 NOTE — TELEPHONE ENCOUNTER
Comments:     Last Office Visit (last PCP visit):   11/2/2023    Next Visit Date:  Future Appointments   Date Time Provider Department Center   5/2/2024  9:00 AM Zach Turcios MD [facility] [place]       **If hasn't been seen in over a year OR hasn't followed up according to last diabetes/ADHD visit, make appointment for patient before sending refill to provider.    Rx requested:  Requested Prescriptions     Pending Prescriptions Disp Refills    ALPRAZolam (XANAX) 0.5 MG tablet [Pharmacy Med Name: ALPRAZOLAM 0.5 MG Tablet] 120 tablet      Sig: TAKE 1 TO 2 TABLETS THREE TIMES DAILY

## 2024-04-16 RX ORDER — ALPRAZOLAM 0.5 MG/1
TABLET ORAL
Qty: 180 TABLET | Refills: 0 | OUTPATIENT
Start: 2024-04-16

## 2024-04-23 ENCOUNTER — HOSPITAL ENCOUNTER (OUTPATIENT)
Dept: CARDIOLOGY | Facility: HOSPITAL | Age: 64
Discharge: HOME | End: 2024-04-23
Payer: MEDICARE

## 2024-04-23 DIAGNOSIS — Z95.810 PRESENCE OF AUTOMATIC CARDIOVERTER/DEFIBRILLATOR (AICD): ICD-10-CM

## 2024-04-23 DIAGNOSIS — I47.29 PAROXYSMAL VENTRICULAR TACHYCARDIA (MULTI): ICD-10-CM

## 2024-04-23 PROCEDURE — 93295 DEV INTERROG REMOTE 1/2/MLT: CPT | Performed by: INTERNAL MEDICINE

## 2024-04-23 PROCEDURE — 93296 REM INTERROG EVL PM/IDS: CPT

## 2024-05-02 ENCOUNTER — TELEMEDICINE (OUTPATIENT)
Dept: FAMILY MEDICINE CLINIC | Age: 64
End: 2024-05-02
Payer: MEDICARE

## 2024-05-02 DIAGNOSIS — F41.9 ANXIETY: ICD-10-CM

## 2024-05-02 DIAGNOSIS — I25.119 CORONARY ARTERY DISEASE INVOLVING NATIVE HEART WITH ANGINA PECTORIS, UNSPECIFIED VESSEL OR LESION TYPE (HCC): ICD-10-CM

## 2024-05-02 DIAGNOSIS — I47.20 VENTRICULAR TACHYCARDIA (HCC): ICD-10-CM

## 2024-05-02 DIAGNOSIS — F19.20 CHEMICAL DEPENDENCY (HCC): ICD-10-CM

## 2024-05-02 DIAGNOSIS — I42.9 CARDIOMYOPATHY, UNSPECIFIED TYPE (HCC): ICD-10-CM

## 2024-05-02 DIAGNOSIS — Z00.00 WELCOME TO MEDICARE PREVENTIVE VISIT: Primary | ICD-10-CM

## 2024-05-02 DIAGNOSIS — D75.89 MACROCYTIC: ICD-10-CM

## 2024-05-02 DIAGNOSIS — R56.9 CONVULSIONS, UNSPECIFIED CONVULSION TYPE (HCC): ICD-10-CM

## 2024-05-02 PROCEDURE — 3017F COLORECTAL CA SCREEN DOC REV: CPT | Performed by: FAMILY MEDICINE

## 2024-05-02 PROCEDURE — G0402 INITIAL PREVENTIVE EXAM: HCPCS | Performed by: FAMILY MEDICINE

## 2024-05-02 PROCEDURE — 1123F ACP DISCUSS/DSCN MKR DOCD: CPT | Performed by: FAMILY MEDICINE

## 2024-05-02 RX ORDER — ALPRAZOLAM 0.5 MG/1
0.5 TABLET ORAL EVERY 6 HOURS PRN
Qty: 120 TABLET | Refills: 2 | Status: SHIPPED | OUTPATIENT
Start: 2024-05-02 | End: 2024-07-31

## 2024-05-02 ASSESSMENT — PATIENT HEALTH QUESTIONNAIRE - PHQ9
SUM OF ALL RESPONSES TO PHQ QUESTIONS 1-9: 0
SUM OF ALL RESPONSES TO PHQ9 QUESTIONS 1 & 2: 0
1. LITTLE INTEREST OR PLEASURE IN DOING THINGS: NOT AT ALL
SUM OF ALL RESPONSES TO PHQ QUESTIONS 1-9: 0
2. FEELING DOWN, DEPRESSED OR HOPELESS: NOT AT ALL

## 2024-05-02 ASSESSMENT — LIFESTYLE VARIABLES
HOW OFTEN DO YOU HAVE A DRINK CONTAINING ALCOHOL: NEVER
HOW MANY STANDARD DRINKS CONTAINING ALCOHOL DO YOU HAVE ON A TYPICAL DAY: PATIENT DOES NOT DRINK

## 2024-05-02 NOTE — PATIENT INSTRUCTIONS
Learning About Being Active as an Older Adult  Why is being active important as you get older?     Being active is one of the best things you can do for your health. And it's never too late to start. Being active--or getting active, if you aren't already--has definite benefits. It can:  Give you more energy,  Keep your mind sharp.  Improve balance to reduce your risk of falls.  Help you manage chronic illness with fewer medicines.  No matter how old you are, how fit you are, or what health problems you have, there is a form of activity that will work for you. And the more physical activity you can do, the better your overall health will be.  What kinds of activity can help you stay healthy?  Being more active will make your daily activities easier. Physical activity includes planned exercise and things you do in daily life. There are four types of activity:  Aerobic.  Doing aerobic activity makes your heart and lungs strong.  Includes walking, dancing, and gardening.  Aim for at least 2½ hours spread throughout the week.  It improves your energy and can help you sleep better.  Muscle-strengthening.  This type of activity can help maintain muscle and strengthen bones.  Includes climbing stairs, using resistance bands, and lifting or carrying heavy loads.  Aim for at least twice a week.  It can help protect the knees and other joints.  Stretching.  Stretching gives you better range of motion in joints and muscles.  Includes upper arm stretches, calf stretches, and gentle yoga.  Aim for at least twice a week, preferably after your muscles are warmed up from other activities.  It can help you function better in daily life.  Balancing.  This helps you stay coordinated and have good posture.  Includes heel-to-toe walking, savita chi, and certain types of yoga.  Aim for at least 3 days a week.  It can reduce your risk of falling.  Even if you have a hard time meeting the recommendations, it's better to be more active

## 2024-05-02 NOTE — PROGRESS NOTES
Medicare Annual Wellness Visit    Taye Turcios is here for Medicare AWV    Assessment & Plan   Welcome to Medicare preventive visit  Anxiety  -     ALPRAZolam (XANAX) 0.5 MG tablet; Take 1 tablet by mouth every 6 hours as needed for Sleep or Anxiety for up to 90 days. TAKE 1 TABLET Q 6 h Max Daily Amount: 2 mg, Disp-120 tablet, R-2Normal  -     TSH; Future  -     T4, Free; Future  Chemical dependency (HCC)  Ventricular tachycardia (HCC)  Convulsions, unspecified convulsion type (HCC)  Cardiomyopathy, unspecified type (HCC)  Coronary artery disease involving native heart with angina pectoris, unspecified vessel or lesion type (HCC)  Macrocytic  -     Vitamin B12 & Folate; Future    Recommendations for Preventive Services Due: see orders and patient instructions/AVS.  Recommended screening schedule for the next 5-10 years is provided to the patient in written form: see Patient Instructions/AVS.     No follow-ups on file.     Subjective   Chief Complaint   Patient presents with    Medicare AWV       Patient's complete Health Risk Assessment and screening values have been reviewed and are found in Flowsheets. The following problems were reviewed today and where indicated follow up appointments were made and/or referrals ordered.    Positive Risk Factor Screenings with Interventions:       Cognitive:      Words recalled: 0 Words Recalled     Total Score Interpretation: Abnormal Mini-Cog  Interventions:  See AVS for additional education material  See A/P for plan and any pertinent orders            Activity, Diet, and Weight:  On average, how many days per week do you engage in moderate to strenuous exercise (like a brisk walk)?: 0 days  On average, how many minutes do you engage in exercise at this level?: 0 min    Do you eat balanced/healthy meals regularly?: Yes    There is no height or weight on file to calculate BMI.      Inactivity Interventions:  See AVS for additional education material  See A/P for plan and any

## 2024-07-11 DIAGNOSIS — F41.9 ANXIETY: ICD-10-CM

## 2024-07-11 RX ORDER — ALPRAZOLAM 0.5 MG/1
TABLET ORAL
Qty: 120 TABLET | Refills: 0 | Status: SHIPPED | OUTPATIENT
Start: 2024-07-11 | End: 2024-08-10

## 2024-07-29 ENCOUNTER — HOSPITAL ENCOUNTER (OUTPATIENT)
Dept: CARDIOLOGY | Facility: HOSPITAL | Age: 64
Discharge: HOME | End: 2024-07-29
Payer: MEDICARE

## 2024-07-29 DIAGNOSIS — I47.29 PAROXYSMAL VENTRICULAR TACHYCARDIA (MULTI): ICD-10-CM

## 2024-07-29 DIAGNOSIS — Z95.810 PRESENCE OF AUTOMATIC CARDIOVERTER/DEFIBRILLATOR (AICD): ICD-10-CM

## 2024-07-29 PROCEDURE — 93295 DEV INTERROG REMOTE 1/2/MLT: CPT | Performed by: INTERNAL MEDICINE

## 2024-07-29 PROCEDURE — 93296 REM INTERROG EVL PM/IDS: CPT

## 2024-07-31 DIAGNOSIS — I10 HYPERTENSION, UNSPECIFIED TYPE: ICD-10-CM

## 2024-08-06 DIAGNOSIS — F41.9 ANXIETY: ICD-10-CM

## 2024-08-07 RX ORDER — ALPRAZOLAM 0.5 MG/1
TABLET ORAL
Qty: 120 TABLET | Refills: 0 | Status: SHIPPED | OUTPATIENT
Start: 2024-08-07 | End: 2024-09-07

## 2024-08-11 DIAGNOSIS — F41.9 ANXIETY: ICD-10-CM

## 2024-08-12 RX ORDER — ALPRAZOLAM 0.5 MG
TABLET ORAL
Qty: 120 TABLET | OUTPATIENT
Start: 2024-08-12

## 2024-09-03 DIAGNOSIS — F41.9 ANXIETY: ICD-10-CM

## 2024-09-03 NOTE — TELEPHONE ENCOUNTER
Comments:     Last Office Visit (last PCP visit):   5/2/2024    Next Visit Date:  Future Appointments   Date Time Provider Department Center   9/11/2024  9:30 AM Zach Turcios MD [facility]       **If hasn't been seen in over a year OR hasn't followed up according to last diabetes/ADHD visit, make appointment for patient before sending refill to provider.    Rx requested:  Requested Prescriptions     Pending Prescriptions Disp Refills    ALPRAZolam (XANAX) 0.5 MG tablet [Pharmacy Med Name: ALPRAZolam Oral Tablet 0.5 MG] 120 tablet      Sig: TAKE 1 TABLET EVERY 6 HOURS AS NEEDED FOR SLEEP OR ANXIETY

## 2024-09-06 RX ORDER — ALPRAZOLAM 0.5 MG
TABLET ORAL
Qty: 120 TABLET | Refills: 0 | OUTPATIENT
Start: 2024-09-06 | End: 2024-10-06

## 2024-09-06 RX ORDER — ALPRAZOLAM 0.5 MG
TABLET ORAL
Qty: 120 TABLET | Refills: 0 | Status: SHIPPED | OUTPATIENT
Start: 2024-09-06 | End: 2024-10-06

## 2024-09-06 NOTE — TELEPHONE ENCOUNTER
The mail in pharmacy hasn't received the alprazolam order and this is an ongoing issue with this pharmacy.  Pt is wondering if someone can contact them and get this medication ordered for him?

## 2024-09-11 ENCOUNTER — OFFICE VISIT (OUTPATIENT)
Dept: FAMILY MEDICINE CLINIC | Age: 64
End: 2024-09-11
Payer: MEDICARE

## 2024-09-11 VITALS
TEMPERATURE: 98 F | WEIGHT: 165.2 LBS | BODY MASS INDEX: 23.13 KG/M2 | HEART RATE: 72 BPM | DIASTOLIC BLOOD PRESSURE: 70 MMHG | RESPIRATION RATE: 16 BRPM | HEIGHT: 71 IN | SYSTOLIC BLOOD PRESSURE: 114 MMHG

## 2024-09-11 DIAGNOSIS — F41.9 ANXIETY: ICD-10-CM

## 2024-09-11 DIAGNOSIS — I42.9 CARDIOMYOPATHY, UNSPECIFIED TYPE (HCC): Primary | ICD-10-CM

## 2024-09-11 DIAGNOSIS — Z79.899 LONG TERM PRESCRIPTION BENZODIAZEPINE USE: ICD-10-CM

## 2024-09-11 DIAGNOSIS — Z12.5 SCREENING PSA (PROSTATE SPECIFIC ANTIGEN): ICD-10-CM

## 2024-09-11 DIAGNOSIS — G89.29 CHRONIC LOW BACK PAIN, UNSPECIFIED BACK PAIN LATERALITY, UNSPECIFIED WHETHER SCIATICA PRESENT: ICD-10-CM

## 2024-09-11 DIAGNOSIS — R56.9 CONVULSIONS, UNSPECIFIED CONVULSION TYPE (HCC): ICD-10-CM

## 2024-09-11 DIAGNOSIS — M54.50 CHRONIC LOW BACK PAIN, UNSPECIFIED BACK PAIN LATERALITY, UNSPECIFIED WHETHER SCIATICA PRESENT: ICD-10-CM

## 2024-09-11 DIAGNOSIS — F19.20 CHEMICAL DEPENDENCY (HCC): ICD-10-CM

## 2024-09-11 DIAGNOSIS — E78.5 HYPERLIPIDEMIA, UNSPECIFIED HYPERLIPIDEMIA TYPE: ICD-10-CM

## 2024-09-11 DIAGNOSIS — I47.20 VENTRICULAR TACHYCARDIA (HCC): ICD-10-CM

## 2024-09-11 PROCEDURE — G8420 CALC BMI NORM PARAMETERS: HCPCS | Performed by: FAMILY MEDICINE

## 2024-09-11 PROCEDURE — 1123F ACP DISCUSS/DSCN MKR DOCD: CPT | Performed by: FAMILY MEDICINE

## 2024-09-11 PROCEDURE — 1036F TOBACCO NON-USER: CPT | Performed by: FAMILY MEDICINE

## 2024-09-11 PROCEDURE — 3017F COLORECTAL CA SCREEN DOC REV: CPT | Performed by: FAMILY MEDICINE

## 2024-09-11 PROCEDURE — 99214 OFFICE O/P EST MOD 30 MIN: CPT | Performed by: FAMILY MEDICINE

## 2024-09-11 PROCEDURE — G8427 DOCREV CUR MEDS BY ELIG CLIN: HCPCS | Performed by: FAMILY MEDICINE

## 2024-09-11 RX ORDER — ALPRAZOLAM 0.5 MG
TABLET ORAL
Qty: 120 TABLET | Refills: 0 | Status: SHIPPED | OUTPATIENT
Start: 2024-10-11 | End: 2024-11-11

## 2024-09-16 DIAGNOSIS — Z12.5 SCREENING PSA (PROSTATE SPECIFIC ANTIGEN): ICD-10-CM

## 2024-09-16 DIAGNOSIS — I42.9 CARDIOMYOPATHY, UNSPECIFIED TYPE (HCC): ICD-10-CM

## 2024-09-16 DIAGNOSIS — D75.89 MACROCYTIC: ICD-10-CM

## 2024-09-16 DIAGNOSIS — F41.9 ANXIETY: ICD-10-CM

## 2024-09-16 DIAGNOSIS — E78.5 HYPERLIPIDEMIA, UNSPECIFIED HYPERLIPIDEMIA TYPE: ICD-10-CM

## 2024-09-16 LAB
ALBUMIN SERPL-MCNC: 4.1 G/DL (ref 3.5–4.6)
ALP SERPL-CCNC: 95 U/L (ref 35–104)
ALT SERPL-CCNC: <5 U/L (ref 0–41)
ANION GAP SERPL CALCULATED.3IONS-SCNC: 11 MEQ/L (ref 9–15)
AST SERPL-CCNC: 25 U/L (ref 0–40)
BILIRUB SERPL-MCNC: <0.2 MG/DL (ref 0.2–0.7)
BUN SERPL-MCNC: 9 MG/DL (ref 8–23)
CALCIUM SERPL-MCNC: 9.4 MG/DL (ref 8.5–9.9)
CHLORIDE SERPL-SCNC: 103 MEQ/L (ref 95–107)
CHOLEST SERPL-MCNC: 220 MG/DL (ref 0–199)
CO2 SERPL-SCNC: 29 MEQ/L (ref 20–31)
CREAT SERPL-MCNC: 0.9 MG/DL (ref 0.7–1.2)
ERYTHROCYTE [DISTWIDTH] IN BLOOD BY AUTOMATED COUNT: 12.3 % (ref 11.5–14.5)
GLOBULIN SER CALC-MCNC: 2.8 G/DL (ref 2.3–3.5)
GLUCOSE SERPL-MCNC: 80 MG/DL (ref 70–99)
HCT VFR BLD AUTO: 41.6 % (ref 42–52)
HDLC SERPL-MCNC: 58 MG/DL (ref 40–59)
HGB BLD-MCNC: 13.7 G/DL (ref 14–18)
LDLC SERPL CALC-MCNC: 140 MG/DL (ref 0–129)
MCH RBC QN AUTO: 32.6 PG (ref 27–31.3)
MCHC RBC AUTO-ENTMCNC: 32.9 % (ref 33–37)
MCV RBC AUTO: 99 FL (ref 79–92.2)
PLATELET # BLD AUTO: 260 K/UL (ref 130–400)
POTASSIUM SERPL-SCNC: 4.4 MEQ/L (ref 3.4–4.9)
PROT SERPL-MCNC: 6.9 G/DL (ref 6.3–8)
PSA SERPL-MCNC: 5.1 NG/ML (ref 0–4)
RBC # BLD AUTO: 4.2 M/UL (ref 4.7–6.1)
SODIUM SERPL-SCNC: 143 MEQ/L (ref 135–144)
T4 FREE SERPL-MCNC: 1.01 NG/DL (ref 0.84–1.68)
TRIGL SERPL-MCNC: 111 MG/DL (ref 0–150)
TSH SERPL-MCNC: 4.56 UIU/ML (ref 0.44–3.86)
WBC # BLD AUTO: 4.3 K/UL (ref 4.8–10.8)

## 2024-09-16 RX ORDER — TAMSULOSIN HYDROCHLORIDE 0.4 MG/1
0.4 CAPSULE ORAL 2 TIMES DAILY
Qty: 180 CAPSULE | Refills: 0 | Status: SHIPPED | OUTPATIENT
Start: 2024-09-16

## 2024-09-17 LAB
FOLATE: >20 NG/ML (ref 4.8–24.2)
VITAMIN B-12: 852 PG/ML (ref 232–1245)

## 2024-10-30 DIAGNOSIS — F41.9 ANXIETY: ICD-10-CM

## 2024-10-30 NOTE — TELEPHONE ENCOUNTER
Pt states that he knows he's not due for a refill until next week. But he states he's had trouble getting his meds filled when he needs them.

## 2024-10-31 DIAGNOSIS — F41.9 ANXIETY: ICD-10-CM

## 2024-10-31 RX ORDER — ALPRAZOLAM 0.5 MG
TABLET ORAL
Qty: 120 TABLET | Refills: 0 | Status: SHIPPED | OUTPATIENT
Start: 2024-10-31 | End: 2024-11-30

## 2024-10-31 RX ORDER — ALPRAZOLAM 0.5 MG
TABLET ORAL
Qty: 120 TABLET | Refills: 0 | Status: SHIPPED | OUTPATIENT
Start: 2024-10-31 | End: 2024-12-01

## 2024-10-31 NOTE — TELEPHONE ENCOUNTER
Comments: pt knows he is due next week but does not want to be late on calling it in     Pt is asking if this can have 2 refills so he does not have to call every month.      Last Office Visit (last PCP visit):   9/11/2024     Next Visit Date:    Future Appointments   Date Time Provider Department Center   11/25/2024  2:30 PM Dennis Caraballo MD [place] [facility]        **If hasn't been seen in over a year OR hasn't followed up according to last diabetes/ADHD visit, make appointment for patient before sending refill to provider.     Rx requested:    Requested Prescriptions     Pending Prescriptions Disp Refills    ALPRAZolam (XANAX) 0.5 MG tablet 120 tablet 0     Sig: TAKE 1 TABLET EVERY 6 HOURS AS NEEDED FOR SLEEP OR ANXIETY

## 2024-11-04 ENCOUNTER — HOSPITAL ENCOUNTER (OUTPATIENT)
Dept: CARDIOLOGY | Facility: HOSPITAL | Age: 64
Discharge: HOME | End: 2024-11-04
Payer: MEDICARE

## 2024-11-04 DIAGNOSIS — Z95.810 PRESENCE OF AUTOMATIC CARDIOVERTER/DEFIBRILLATOR (AICD): ICD-10-CM

## 2024-11-04 DIAGNOSIS — I47.29 PAROXYSMAL VENTRICULAR TACHYCARDIA (MULTI): ICD-10-CM

## 2024-11-04 PROCEDURE — 93296 REM INTERROG EVL PM/IDS: CPT

## 2024-11-04 PROCEDURE — 93295 DEV INTERROG REMOTE 1/2/MLT: CPT | Performed by: INTERNAL MEDICINE

## 2024-11-22 DIAGNOSIS — I10 HYPERTENSION, UNSPECIFIED TYPE: ICD-10-CM

## 2024-11-22 RX ORDER — CARVEDILOL 12.5 MG/1
12.5 TABLET ORAL 2 TIMES DAILY
Qty: 60 TABLET | Refills: 0 | Status: SHIPPED | OUTPATIENT
Start: 2024-11-22 | End: 2024-12-22

## 2024-11-22 RX ORDER — CARVEDILOL 12.5 MG/1
12.5 TABLET ORAL 2 TIMES DAILY
Qty: 180 TABLET | Refills: 3 | OUTPATIENT
Start: 2024-11-22

## 2024-11-22 NOTE — TELEPHONE ENCOUNTER
Received request for prescription refill for patient.  Patient follows with Dr. Tennille Roberts MD, FACC, FACP, RS     Request is for carvedilol   Is patient currently on medication- yes    Last OV- 3/21/23  Next OV- needs scheduling     [facility]- please schedule patient for appointment and then route to providers nurse for completion.

## 2024-11-25 ENCOUNTER — OFFICE VISIT (OUTPATIENT)
Dept: UROLOGY | Age: 64
End: 2024-11-25
Payer: MEDICARE

## 2024-11-25 VITALS
DIASTOLIC BLOOD PRESSURE: 70 MMHG | BODY MASS INDEX: 23.1 KG/M2 | HEIGHT: 71 IN | HEART RATE: 73 BPM | SYSTOLIC BLOOD PRESSURE: 102 MMHG | WEIGHT: 165 LBS

## 2024-11-25 DIAGNOSIS — R97.20 ELEVATED PSA: Primary | ICD-10-CM

## 2024-11-25 PROCEDURE — G8427 DOCREV CUR MEDS BY ELIG CLIN: HCPCS | Performed by: UROLOGY

## 2024-11-25 PROCEDURE — G8420 CALC BMI NORM PARAMETERS: HCPCS | Performed by: UROLOGY

## 2024-11-25 PROCEDURE — 1036F TOBACCO NON-USER: CPT | Performed by: UROLOGY

## 2024-11-25 PROCEDURE — 3017F COLORECTAL CA SCREEN DOC REV: CPT | Performed by: UROLOGY

## 2024-11-25 PROCEDURE — 1159F MED LIST DOCD IN RCRD: CPT | Performed by: UROLOGY

## 2024-11-25 PROCEDURE — 1123F ACP DISCUSS/DSCN MKR DOCD: CPT | Performed by: UROLOGY

## 2024-11-25 PROCEDURE — G8484 FLU IMMUNIZE NO ADMIN: HCPCS | Performed by: UROLOGY

## 2024-11-25 PROCEDURE — 99214 OFFICE O/P EST MOD 30 MIN: CPT | Performed by: UROLOGY

## 2024-11-25 ASSESSMENT — ENCOUNTER SYMPTOMS
ABDOMINAL DISTENTION: 0
ABDOMINAL PAIN: 0

## 2024-11-25 NOTE — PROGRESS NOTES
Medicines as prescribed.  Encourage feeding and fluids.  Return for any new medical problems or complaints.  Follow-up with your doctor in a couple of days.   Subjective:      Patient ID: Taye Turcios is a 64 y.o. male    HPI  This is a 65 yo white male with h/o IBS, Anxiety, Anemia, GERD, DJD/Scoliosis, CAD/Cardiomyopathy, Pacemaker/Arrythmia, HTN, BPH w/LUTs on Flomax and back in follow-up. Since last seen on 21, he has been using Flomax BID and has stable LUTs but may want to consider surgical options in future. He also has Ed and would like to discuss options for this problem as well in future. He is back because of an elevated PSA> he ha sno pain or hematuria or wt loss. He has no new medical or surgical problems.  He has a grandfather with a h/o prostate cancer diagnosed while he was in his 70's. I reviewed the interval PSA.     Past Medical History:   Diagnosis Date    Alcoholic (HCC)     20 years ago    Anxiety     Arthritis     Asthma     CAD (coronary artery disease)     Chemical dependency (HCC) AUG 2010 PER PT    Chronic back pain     Convulsions (HCC) 3/10/2021    Diverticulosis     Fibromyalgia     Hyperlipidemia     IBS (irritable bowel syndrome) 10/13/2011    IV drug abuse (HCC)     Nephrotic syndrome     remission    Osteoarthritis     Panic attacks     Peptic ulcer disease     Rhabdomyolysis 2013    Substance abuse (HCC)     ULTRAM     Past Surgical History:   Procedure Laterality Date    APPENDECTOMY      CARDIAC CATHETERIZATION      COLONOSCOPY  2017    [facility] ANTON[facility] DESIR MD    UPPER GASTROINTESTINAL ENDOSCOPY  2017    [facility]     UPPER GASTROINTESTINAL ENDOSCOPY  10/09/2017    DR. DESIR     Social History     Socioeconomic History    Marital status:      Spouse name: None    Number of children: None    Years of education: None    Highest education level: None   Tobacco Use    Smoking status: Former     Current packs/day: 0.00     Average packs/day: 1.5 packs/day for 20.0 years (30.0 ttl pk-yrs)     Types: Cigarettes     Start date: 10/13/1977     Quit date: 10/13/1997     Years since quittin.1    Smokeless tobacco:

## 2024-12-03 DIAGNOSIS — F41.9 ANXIETY: ICD-10-CM

## 2024-12-03 NOTE — TELEPHONE ENCOUNTER
Comments: pt has talked to Dr. Turcios about sending it in with refills so it will be a 3 month supply and pt will not have to call every month for a refill.       Last Office Visit (last PCP visit):   9/11/2024     Next Visit Date:    Future Appointments   Date Time Provider Department Center   12/10/2024  9:00 AM Dennis Caraballo MD [facility] [facility]        **If hasn't been seen in over a year OR hasn't followed up according to last diabetes/ADHD visit, make appointment for patient before sending refill to provider.     Rx requested:    Requested Prescriptions     Pending Prescriptions Disp Refills    ALPRAZolam (XANAX) 0.5 MG tablet 120 tablet 0     Sig: TAKE 1 TABLET EVERY 6 HOURS AS NEEDED FOR SLEEP OR ANXIETY

## 2024-12-03 NOTE — TELEPHONE ENCOUNTER
Comments: Changed refills to three. Please advise and make any necessary changes!    Last Office Visit (last PCP visit):   9/11/2024    Next Visit Date:  Future Appointments   Date Time Provider Department Center   12/10/2024  9:00 AM Dennis Caraballo MD [facility] [facility]       **If hasn't been seen in over a year OR hasn't followed up according to last diabetes/ADHD visit, make appointment for patient before sending refill to provider.    Rx requested:  Requested Prescriptions     Pending Prescriptions Disp Refills    ALPRAZolam (XANAX) 0.5 MG tablet 120 tablet 3     Sig: TAKE 1 TABLET EVERY 6 HOURS AS NEEDED FOR SLEEP OR ANXIETY

## 2024-12-04 DIAGNOSIS — R97.20 ELEVATED PSA: ICD-10-CM

## 2024-12-04 LAB — PSA SERPL-MCNC: 3.5 NG/ML (ref 0–4)

## 2024-12-04 RX ORDER — ALPRAZOLAM 0.5 MG
TABLET ORAL
Qty: 120 TABLET | Refills: 3 | Status: SHIPPED | OUTPATIENT
Start: 2024-12-04 | End: 2025-01-03

## 2024-12-10 ENCOUNTER — OFFICE VISIT (OUTPATIENT)
Dept: UROLOGY | Age: 64
End: 2024-12-10
Payer: MEDICARE

## 2024-12-10 VITALS
HEART RATE: 73 BPM | HEIGHT: 71 IN | BODY MASS INDEX: 23.1 KG/M2 | SYSTOLIC BLOOD PRESSURE: 102 MMHG | WEIGHT: 165 LBS | DIASTOLIC BLOOD PRESSURE: 72 MMHG

## 2024-12-10 DIAGNOSIS — N40.1 BPH WITH LOWER URINARY TRACT SYMPTOMS WITHOUT URINARY OBSTRUCTION: Primary | ICD-10-CM

## 2024-12-10 PROCEDURE — G8420 CALC BMI NORM PARAMETERS: HCPCS | Performed by: UROLOGY

## 2024-12-10 PROCEDURE — 3017F COLORECTAL CA SCREEN DOC REV: CPT | Performed by: UROLOGY

## 2024-12-10 PROCEDURE — 1036F TOBACCO NON-USER: CPT | Performed by: UROLOGY

## 2024-12-10 PROCEDURE — 1159F MED LIST DOCD IN RCRD: CPT | Performed by: UROLOGY

## 2024-12-10 PROCEDURE — 1123F ACP DISCUSS/DSCN MKR DOCD: CPT | Performed by: UROLOGY

## 2024-12-10 PROCEDURE — G8484 FLU IMMUNIZE NO ADMIN: HCPCS | Performed by: UROLOGY

## 2024-12-10 PROCEDURE — 1160F RVW MEDS BY RX/DR IN RCRD: CPT | Performed by: UROLOGY

## 2024-12-10 PROCEDURE — 99213 OFFICE O/P EST LOW 20 MIN: CPT | Performed by: UROLOGY

## 2024-12-10 PROCEDURE — G8427 DOCREV CUR MEDS BY ELIG CLIN: HCPCS | Performed by: UROLOGY

## 2024-12-10 ASSESSMENT — ENCOUNTER SYMPTOMS
ABDOMINAL DISTENTION: 0
ABDOMINAL PAIN: 0

## 2024-12-10 NOTE — PROGRESS NOTES
Subjective:      Patient ID: Taye Turcios is a 64 y.o. male    HPI  This is a 65 yo white male with h/o IBS, Anxiety, Anemia, GERD, DJD/Scoliosis, CAD/Cardiomyopathy, Pacemaker/Arrythmia, HTN, BPH w/LUTs on Flomax and back in follow-up. When last seen on 24, he was found to have a rise in the PSA and is back after a repeat PSA. He has no interval complaints. He has a grandfather with a h/o prostate cancer diagnosed while he was in his 70's. I reviewed the interval PSA.     Past Medical History:   Diagnosis Date    Alcoholic (HCC)     20 years ago    Anxiety     Arthritis     Asthma     CAD (coronary artery disease)     Chemical dependency (HCC) AUG 2010 PER PT    Chronic back pain     Convulsions (HCC) 3/10/2021    Diverticulosis     Fibromyalgia     Hyperlipidemia     IBS (irritable bowel syndrome) 10/13/2011    IV drug abuse (HCC)     Nephrotic syndrome     remission    Osteoarthritis     Panic attacks     Peptic ulcer disease     Rhabdomyolysis 2013    Substance abuse (HCC)     ULTRAM     Past Surgical History:   Procedure Laterality Date    APPENDECTOMY      CARDIAC CATHETERIZATION      COLONOSCOPY  2017    [facility] DES[facility] MD    UPPER GASTROINTESTINAL ENDOSCOPY  2017    [facility]     UPPER GASTROINTESTINAL ENDOSCOPY  10/09/2017    DR. DESIR     Social History     Socioeconomic History    Marital status:      Spouse name: None    Number of children: None    Years of education: None    Highest education level: None   Tobacco Use    Smoking status: Former     Current packs/day: 0.00     Average packs/day: 1.5 packs/day for 20.0 years (30.0 ttl pk-yrs)     Types: Cigarettes     Start date: 10/13/1977     Quit date: 10/13/1997     Years since quittin.1    Smokeless tobacco: Never   Substance and Sexual Activity    Alcohol use: No     Comment: H/O SUBSTANCE ABUSE INCLUDING ALCOHOL    Drug use: No     Comment: CHEMICAL DEPENDENCY/ADDICTION    Sexual activity: Not Currently     Social

## 2024-12-13 DIAGNOSIS — I10 HYPERTENSION, UNSPECIFIED TYPE: ICD-10-CM

## 2024-12-13 RX ORDER — CARVEDILOL 12.5 MG/1
12.5 TABLET ORAL 2 TIMES DAILY
Qty: 180 TABLET | Refills: 0 | Status: SHIPPED | OUTPATIENT
Start: 2024-12-13 | End: 2025-03-13

## 2024-12-13 NOTE — TELEPHONE ENCOUNTER
Received request for prescription refill for patient.  Patient follows with Dr. Tennille Roberts MD, FACC, FACP, RS     Request is for carvedilol  Is patient currently on medication- yes    Last OV- 3/31/23  Next OV- 1/10/25    Pended for signing and sent to provider.

## 2025-01-02 ENCOUNTER — TELEPHONE (OUTPATIENT)
Dept: CARDIOLOGY | Facility: CLINIC | Age: 65
End: 2025-01-02
Payer: MEDICARE

## 2025-01-02 DIAGNOSIS — Z95.810 AICD (AUTOMATIC CARDIOVERTER/DEFIBRILLATOR) PRESENT: Primary | ICD-10-CM

## 2025-01-02 NOTE — TELEPHONE ENCOUNTER
----- Message from Sharonda WEISS sent at 1/2/2025 11:19 AM EST -----  Would you be able to add a device check for this patient?

## 2025-01-10 ENCOUNTER — APPOINTMENT (OUTPATIENT)
Dept: CARDIOLOGY | Facility: CLINIC | Age: 65
End: 2025-01-10
Payer: MEDICARE

## 2025-01-29 ENCOUNTER — APPOINTMENT (OUTPATIENT)
Dept: CARDIOLOGY | Facility: CLINIC | Age: 65
End: 2025-01-29
Payer: MEDICARE

## 2025-02-18 ENCOUNTER — APPOINTMENT (OUTPATIENT)
Dept: CARDIOLOGY | Facility: HOSPITAL | Age: 65
End: 2025-02-18
Payer: MEDICARE

## 2025-02-18 DIAGNOSIS — I10 HYPERTENSION, UNSPECIFIED TYPE: ICD-10-CM

## 2025-02-18 RX ORDER — LISINOPRIL 2.5 MG/1
2.5 TABLET ORAL DAILY
Qty: 90 TABLET | Refills: 1 | Status: SHIPPED | OUTPATIENT
Start: 2025-02-18

## 2025-02-27 ENCOUNTER — HOSPITAL ENCOUNTER (OUTPATIENT)
Dept: CARDIOLOGY | Facility: HOSPITAL | Age: 65
Discharge: HOME | End: 2025-02-27
Payer: MEDICARE

## 2025-02-27 DIAGNOSIS — Z95.810 PRESENCE OF AUTOMATIC CARDIOVERTER/DEFIBRILLATOR (AICD): ICD-10-CM

## 2025-02-27 DIAGNOSIS — I47.29 VENTRICULAR TACHYCARDIA (PAROXYSMAL) (MULTI): ICD-10-CM

## 2025-02-27 PROCEDURE — 93296 REM INTERROG EVL PM/IDS: CPT

## 2025-02-27 PROCEDURE — 93295 DEV INTERROG REMOTE 1/2/MLT: CPT | Performed by: INTERNAL MEDICINE

## 2025-03-03 RX ORDER — GABAPENTIN 600 MG/1
TABLET ORAL
Qty: 120 TABLET | Refills: 5 | Status: SHIPPED | OUTPATIENT
Start: 2025-03-03 | End: 2026-03-04

## 2025-03-03 NOTE — TELEPHONE ENCOUNTER
Comments: mychart sent    Last Office Visit (last PCP visit):   9/11/2024    Next Visit Date:  Future Appointments   Date Time Provider Department Center   12/11/2025  9:15 AM Dennis Caraballo MD [place] [facility]       **If hasn't been seen in over a year OR hasn't followed up according to last diabetes/ADHD visit, make appointment for patient before sending refill to provider.    Rx requested:  Requested Prescriptions     Pending Prescriptions Disp Refills    gabapentin (NEURONTIN) 600 MG tablet [Pharmacy Med Name: Gabapentin Oral Tablet 600 MG] 120 tablet 11     Sig: TAKE 1 TABLET EVERY 4 HOURS

## 2025-04-03 DIAGNOSIS — F41.9 ANXIETY: Primary | ICD-10-CM

## 2025-04-03 RX ORDER — ALPRAZOLAM 0.5 MG
0.5 TABLET ORAL 3 TIMES DAILY PRN
Qty: 90 TABLET | Refills: 0 | Status: SHIPPED | OUTPATIENT
Start: 2025-04-03 | End: 2025-05-03

## 2025-04-17 RX ORDER — TAMSULOSIN HYDROCHLORIDE 0.4 MG/1
CAPSULE ORAL
Qty: 180 CAPSULE | Refills: 3 | Status: SHIPPED | OUTPATIENT
Start: 2025-04-17

## 2025-05-02 DIAGNOSIS — F41.9 ANXIETY: ICD-10-CM

## 2025-05-05 ENCOUNTER — OFFICE VISIT (OUTPATIENT)
Dept: FAMILY MEDICINE CLINIC | Age: 65
End: 2025-05-05
Payer: MEDICARE

## 2025-05-05 VITALS
OXYGEN SATURATION: 99 % | HEART RATE: 70 BPM | SYSTOLIC BLOOD PRESSURE: 122 MMHG | WEIGHT: 152.2 LBS | HEIGHT: 71 IN | DIASTOLIC BLOOD PRESSURE: 76 MMHG | TEMPERATURE: 98.2 F | BODY MASS INDEX: 21.31 KG/M2

## 2025-05-05 DIAGNOSIS — M54.50 CHRONIC LOW BACK PAIN, UNSPECIFIED BACK PAIN LATERALITY, UNSPECIFIED WHETHER SCIATICA PRESENT: ICD-10-CM

## 2025-05-05 DIAGNOSIS — I47.20 VENTRICULAR TACHYCARDIA (HCC): ICD-10-CM

## 2025-05-05 DIAGNOSIS — F19.20 CHEMICAL DEPENDENCY (HCC): ICD-10-CM

## 2025-05-05 DIAGNOSIS — F41.9 ANXIETY: Primary | ICD-10-CM

## 2025-05-05 DIAGNOSIS — K27.9 PEPTIC ULCER DISEASE: ICD-10-CM

## 2025-05-05 DIAGNOSIS — G89.29 CHRONIC LOW BACK PAIN, UNSPECIFIED BACK PAIN LATERALITY, UNSPECIFIED WHETHER SCIATICA PRESENT: ICD-10-CM

## 2025-05-05 DIAGNOSIS — E78.5 HYPERLIPIDEMIA, UNSPECIFIED HYPERLIPIDEMIA TYPE: ICD-10-CM

## 2025-05-05 DIAGNOSIS — R56.9 CONVULSIONS, UNSPECIFIED CONVULSION TYPE (HCC): ICD-10-CM

## 2025-05-05 DIAGNOSIS — I42.9 CARDIOMYOPATHY, UNSPECIFIED TYPE (HCC): ICD-10-CM

## 2025-05-05 PROCEDURE — G8420 CALC BMI NORM PARAMETERS: HCPCS | Performed by: NURSE PRACTITIONER

## 2025-05-05 PROCEDURE — G8427 DOCREV CUR MEDS BY ELIG CLIN: HCPCS | Performed by: NURSE PRACTITIONER

## 2025-05-05 PROCEDURE — 3017F COLORECTAL CA SCREEN DOC REV: CPT | Performed by: NURSE PRACTITIONER

## 2025-05-05 PROCEDURE — 1159F MED LIST DOCD IN RCRD: CPT | Performed by: NURSE PRACTITIONER

## 2025-05-05 PROCEDURE — 1160F RVW MEDS BY RX/DR IN RCRD: CPT | Performed by: NURSE PRACTITIONER

## 2025-05-05 PROCEDURE — 99214 OFFICE O/P EST MOD 30 MIN: CPT | Performed by: NURSE PRACTITIONER

## 2025-05-05 PROCEDURE — 1123F ACP DISCUSS/DSCN MKR DOCD: CPT | Performed by: NURSE PRACTITIONER

## 2025-05-05 PROCEDURE — 1036F TOBACCO NON-USER: CPT | Performed by: NURSE PRACTITIONER

## 2025-05-05 RX ORDER — CELECOXIB 100 MG/1
100 CAPSULE ORAL 2 TIMES DAILY
Qty: 60 CAPSULE | Refills: 2 | Status: SHIPPED | OUTPATIENT
Start: 2025-05-05

## 2025-05-05 RX ORDER — ALPRAZOLAM 0.5 MG
0.5 TABLET ORAL 4 TIMES DAILY PRN
Qty: 90 TABLET | Refills: 0 | Status: SHIPPED | OUTPATIENT
Start: 2025-05-05 | End: 2025-06-04

## 2025-05-05 RX ORDER — ALPRAZOLAM 0.5 MG
0.5 TABLET ORAL NIGHTLY PRN
COMMUNITY
End: 2025-05-05 | Stop reason: SDUPTHER

## 2025-05-05 SDOH — ECONOMIC STABILITY: FOOD INSECURITY: WITHIN THE PAST 12 MONTHS, YOU WORRIED THAT YOUR FOOD WOULD RUN OUT BEFORE YOU GOT MONEY TO BUY MORE.: NEVER TRUE

## 2025-05-05 SDOH — ECONOMIC STABILITY: FOOD INSECURITY: WITHIN THE PAST 12 MONTHS, THE FOOD YOU BOUGHT JUST DIDN'T LAST AND YOU DIDN'T HAVE MONEY TO GET MORE.: NEVER TRUE

## 2025-05-05 ASSESSMENT — ENCOUNTER SYMPTOMS
NAUSEA: 0
SORE THROAT: 0
CONSTIPATION: 0
ABDOMINAL PAIN: 1
SINUS PAIN: 0
BACK PAIN: 1
CHEST TIGHTNESS: 0
VOMITING: 0
RHINORRHEA: 0
COUGH: 0
DIARRHEA: 0
SINUS PRESSURE: 0
WHEEZING: 0
SHORTNESS OF BREATH: 0
TROUBLE SWALLOWING: 0
BLOOD IN STOOL: 0

## 2025-05-05 ASSESSMENT — PATIENT HEALTH QUESTIONNAIRE - PHQ9
1. LITTLE INTEREST OR PLEASURE IN DOING THINGS: NOT AT ALL
SUM OF ALL RESPONSES TO PHQ QUESTIONS 1-9: 0
2. FEELING DOWN, DEPRESSED OR HOPELESS: NOT AT ALL

## 2025-05-05 NOTE — PROGRESS NOTES
pain management at this time.  - Will provide stretching program for lumbar back pain via MyChart  - Laboratory work due labs ordered today in office.  -Continue with Salonpas Lamb as previously prescribed, medication sent to pharmacy OARRS was reviewed and appropriate.  - Follow-up in 3 months to assess response to Celebrex and chronic follow-up with use of Apresoline.      Return in about 3 months (around 8/5/2025).    COMMUNICATION:       Electronically signed by ELINA Norris CNP, on 5/5/2025 9:59 AM

## 2025-05-06 ENCOUNTER — TELEPHONE (OUTPATIENT)
Dept: FAMILY MEDICINE CLINIC | Age: 65
End: 2025-05-06

## 2025-05-06 NOTE — TELEPHONE ENCOUNTER
Please let patient know to take the medication 4 times daily and will send in for the additional 30 once he gets towards the end of the 90 pills he got insurance/pharmacy is unlikely to fill the additional 30 and I will make this change in the chart to avoid any further issues with refills

## 2025-05-06 NOTE — TELEPHONE ENCOUNTER
Pt called. Xanax was sent to pharmacy incorrectly. It is written 4x daily, but quantity 90. He does take 4 daily, so he needs a new script for an additional 30 ordered.  Please let pt know when sent.    CAROLINE Flynn

## 2025-05-08 ENCOUNTER — TELEPHONE (OUTPATIENT)
Dept: FAMILY MEDICINE CLINIC | Age: 65
End: 2025-05-08

## 2025-05-08 NOTE — TELEPHONE ENCOUNTER
Pt calling stating he still has a lot of pain in his back. Asking if he could get the referral to get nerve blocking shots. Pt also would like a call back with more information about the shots.

## 2025-05-09 DIAGNOSIS — M54.50 CHRONIC LOW BACK PAIN, UNSPECIFIED BACK PAIN LATERALITY, UNSPECIFIED WHETHER SCIATICA PRESENT: Primary | ICD-10-CM

## 2025-05-09 DIAGNOSIS — G89.29 CHRONIC LOW BACK PAIN, UNSPECIFIED BACK PAIN LATERALITY, UNSPECIFIED WHETHER SCIATICA PRESENT: Primary | ICD-10-CM

## 2025-05-09 NOTE — TELEPHONE ENCOUNTER
Referral placed to pain management in [place] they should be in contact with patient to schedule this appointment.

## 2025-05-12 ENCOUNTER — INITIAL CONSULT (OUTPATIENT)
Age: 65
End: 2025-05-12
Payer: MEDICARE

## 2025-05-12 VITALS
BODY MASS INDEX: 21.28 KG/M2 | HEART RATE: 68 BPM | TEMPERATURE: 97.5 F | WEIGHT: 152 LBS | HEIGHT: 71 IN | OXYGEN SATURATION: 100 %

## 2025-05-12 DIAGNOSIS — M54.16 LUMBAR RADICULOPATHY: Primary | ICD-10-CM

## 2025-05-12 PROCEDURE — 1160F RVW MEDS BY RX/DR IN RCRD: CPT | Performed by: PAIN MEDICINE

## 2025-05-12 PROCEDURE — 1036F TOBACCO NON-USER: CPT | Performed by: PAIN MEDICINE

## 2025-05-12 PROCEDURE — G8427 DOCREV CUR MEDS BY ELIG CLIN: HCPCS | Performed by: PAIN MEDICINE

## 2025-05-12 PROCEDURE — G8420 CALC BMI NORM PARAMETERS: HCPCS | Performed by: PAIN MEDICINE

## 2025-05-12 PROCEDURE — 99203 OFFICE O/P NEW LOW 30 MIN: CPT | Performed by: PAIN MEDICINE

## 2025-05-12 PROCEDURE — 1123F ACP DISCUSS/DSCN MKR DOCD: CPT | Performed by: PAIN MEDICINE

## 2025-05-12 PROCEDURE — 1159F MED LIST DOCD IN RCRD: CPT | Performed by: PAIN MEDICINE

## 2025-05-12 PROCEDURE — 1125F AMNT PAIN NOTED PAIN PRSNT: CPT | Performed by: PAIN MEDICINE

## 2025-05-12 PROCEDURE — 3017F COLORECTAL CA SCREEN DOC REV: CPT | Performed by: PAIN MEDICINE

## 2025-05-12 NOTE — PROGRESS NOTES
History of Present Illness     Patient Identification  Taye Turcios is a 64 y.o. male.    Patient information was obtained from patient.      Chief Complaint   Chief Complaint   Patient presents with    Consultation       Patient presents with H/o Scoliosis  complaint of Low back pain. This is a result of no known injury. Onset of pain was 3 months ago and has been rapidly worsening since. The pain is located in diffuse lower back, described as sharp, dull, and Numbness and rated as moderate, severe, and 5-10 / 10, with radiation to Left leg rarely. Symptoms include no other symptoms. The patient also Denied complains of fever, dysuria, weight loss, history of cancer, history of osteoporosis, history of steroid use. The patient denies weakness, incontinence, dysuria, hematuria, abdominal pain. The patient denies other injuries. Care prior to arrival consisted of Chiropractor for 3 months, with some relief, Ibuprofen tylenol Heat and cold  with minimal relief.  Xray : IMPRESSION:  MULTILEVEL OSTEOARTHRITIS OF THE LUMBAR SPINE. There is a rotatory levoscoliosis.  Which has increased since the prior study  of 2012    Past Medical History:   Diagnosis Date    Alcoholic ([facility])     20 years ago    Anxiety     Arthritis     Asthma     CAD (coronary artery disease)     Chemical dependency ([facility]) AUG 2010 PER PT    Chronic back pain     Convulsions ([facility]) 3/10/2021    Diverticulosis     Fibromyalgia     Hyperlipidemia     IBS (irritable bowel syndrome) 10/13/2011    IV drug abuse ([facility])     Nephrotic syndrome     remission    Osteoarthritis     Panic attacks     Peptic ulcer disease     Rhabdomyolysis 7/2013    Substance abuse ([facility])     ULTRAM     Family History   Problem Relation Age of Onset    Diabetes Mother     Early Death Father     Diabetes Other      Current Outpatient Medications   Medication Sig Dispense Refill    ALPRAZolam (XANAX) 0.5 MG tablet Take 1 tablet by mouth 4 times daily as needed for Sleep or Anxiety for

## 2025-05-19 DIAGNOSIS — M54.50 CHRONIC LOW BACK PAIN, UNSPECIFIED BACK PAIN LATERALITY, UNSPECIFIED WHETHER SCIATICA PRESENT: Primary | ICD-10-CM

## 2025-05-19 DIAGNOSIS — G89.29 CHRONIC LOW BACK PAIN, UNSPECIFIED BACK PAIN LATERALITY, UNSPECIFIED WHETHER SCIATICA PRESENT: Primary | ICD-10-CM

## 2025-05-19 RX ORDER — LIDOCAINE 50 MG/G
1 PATCH TOPICAL DAILY
Qty: 30 PATCH | Refills: 5 | Status: SHIPPED | OUTPATIENT
Start: 2025-05-19 | End: 2025-11-15

## 2025-05-24 NOTE — PROGRESS NOTES
"  Patient ID: Danish Hyde is a 64 y.o. male who presents for Follow-up (Follow up device check, SVT and Ventricular fibrillation management./).  History of Present Illness  Danish Hyde is a 64 year old male with a defibrillator who presents for follow-up regarding episodes of non-sustained ventricular tachycardia.  He is here to reestablish care as he has not been seen in the EP office since 2018.  He has had remote checks.  His last in clinic device check was in 2023.    Patient denies any arrhythmia symptoms of palpitation, lightheadedness, near syncope, or syncope.    He has experienced eight episodes of non-sustained ventricular tachycardia since March 2023, with heart rates between 150 and 167 beats per minute. These episodes did not result in defibrillator shocks as he did not reach the threshold of 188 beats per minute. The episodes were brief and did not meet the duration required for a shock.    He began a new exercise regimen in February 2025, including treadmill workouts, but the episodes occurred outside of exercise periods, specifically on January 28, 2024, February 23, 2024, and July 14, 2023.    The device site is healing well with some itchiness attributed to eczema. There is no redness or swelling. No unexpected defibrillator shocks have occurred, and his heartbeat feels normal.    PMHx:  See list    PSHx:  See list    Social Hx:  Social History[1]    Family Hx:  Family History[2]    Review of Systems   Cardiovascular:  Negative for chest pain, dyspnea on exertion and palpitations.   All other systems reviewed and are negative.        Objective     /64 (BP Location: Left arm, Patient Position: Sitting)   Pulse 63   Ht 1.74 m (5' 8.5\")   Wt 73.5 kg (162 lb)   BMI 24.27 kg/m²        LABS:  CBC: No results found for: \"WBC\", \"RBC\", \"HGB\", \"HCT\", \"MCV\", \"MCH\", \"MCHC\", \"RDW\", \"PLT\", \"MPV\"  CBC with Differential:  No results found for: \"WBC\", \"RBC\", \"HGB\", \"HCT\", \"PLT\", \"MCV\", \"MCH\", \"MCHC\", " "\"RDW\", \"NRBC\", \"SEGSPCT\", \"BANDSPCT\", \"BLASTSPCT\", \"METASPCT\", \"LYMPHOPCT\", \"PROMYELOPCT\", \"MONOPCT\", \"MYELOPCT\", \"EOSPCT\", \"BASOPCT\", \"MONOSABS\", \"LYMPHSABS\", \"EOSABS\", \"BASOSABS\", \"DIFFTYPE\"  CMP:  No results found for: \"NA\", \"K\", \"CL\", \"CO2\", \"BUN\", \"CREATININE\", \"AGRATIO\", \"GLUCOSE\", \"GLU\", \"PROT\", \"CALCIUM\", \"BILITOT\", \"ALKPHOS\", \"AST\", \"ALT\"  BMP:  No results found for: \"NA\", \"K\", \"CL\", \"CO2\", \"BUN\", \"CREATININE\", \"CALCIUM\", \"GLUCOSE\", \"GLU\"  Magnesium:No results found for: \"MG\"        Physical Exam  Constitutional:       General: Awake.      Appearance: Normal and healthy appearance. Well-developed and not in distress.   Neck:      Vascular: No JVR. JVD normal.   Pulmonary:      Effort: Pulmonary effort is normal.      Breath sounds: Normal breath sounds. No wheezing. No rhonchi. No rales.   Chest:      Chest wall: Not tender to palpatation.   Cardiovascular:      PMI at left midclavicular line. Normal rate. Regular rhythm. Normal S1. Normal S2.       Murmurs: There is no murmur.      No gallop.  No click. No rub.   Pulses:     Intact distal pulses.   Edema:     Peripheral edema absent.   Abdominal:      Tenderness: There is no abdominal tenderness.   Musculoskeletal: Normal range of motion.         General: No tenderness. Skin:     General: Skin is warm and dry.   Neurological:      General: No focal deficit present.      Mental Status: Alert and oriented to person, place and time.           Device check. See scanned.  ECG. See scanned.    Assessment & Plan  Non-sustained ventricular tachycardia.  No documented ejection fraction over the past 7 years.  Declines any evaluation except for rare office visit  Eight NSVT episodes since March 2023, brief and below defibrillator shock threshold. Not all episodes were exercise-related. Device set for antitachycardia pacing and shocks above 188 bpm.  - Monitor heart rhythm with remote checks every three months, increase frequency as battery nears end of life.  - " Schedule in-person device clinic visit in nine months to 11 months, paired with EP office visit.  - Educated on device alerts and contact protocol.  -Educated on compliance of device    Presence of cardiac defibrillator  Defibrillator in place for 11 years, functioning well with one year of battery life remaining. Replacement anticipated within next year.  - Plan outpatient defibrillator replacement when battery life is within three months of end.  - Schedule more frequent device checks as battery life decreases.  -Briefly discussed eventual generator change    Counseled greater than 50% of the visit.  The patient and I discussed arrhythmia, ECG, shared decision making, [place] decision tool, compliance with follow-up, device checks, patient concerned about cost for remote check versus in clinic check, treatment options, risk, benefits, and imponderables.  Lifestyle modifications reviewed.  All questions answered.  Patient appreciative of care  Assessment & Plan  ICD (implantable cardioverter-defibrillator), single, in situ    Ventricular tachycardia (Multi)    Ventricular fibrillation (Multi)    SVT (supraventricular tachycardia)    Nonischemic cardiomyopathy (Multi)    Primary hypertension    Mixed hyperlipidemia    BMI 24.0-24.9, adult    Former smoker    Encounter for medication review and counseling    Encounter to discuss treatment options          This medical note was created with the assistance of artificial intelligence (AI) for documentation purposes. The content has been reviewed and confirmed by the healthcare provider for accuracy and completeness. Patient consented to the use of audio recording and use of AI during their visit.     I, , personally performed the services described in the documentation as scribed by the nurse in my presence, and confirm it is both accurate and complete.     Total, 84, with review of the last 3 years of device clinic checks, discussion of ventricular tachycardia,  device function, review of canceled appointment encounters, and ECG's       [1]   Social History  Socioeconomic History    Marital status:    Tobacco Use    Smoking status: Former     Current packs/day: 0.00     Types: Cigarettes     Quit date:      Years since quittin.4    Smokeless tobacco: Never   Substance and Sexual Activity    Alcohol use: Not Currently    Drug use: Not Currently     Social Drivers of Health     Financial Resource Strain: Low Risk  (2023)    Received from [facility]    Overall Financial Resource Strain (CARDIA)     Difficulty of Paying Living Expenses: Not hard at all   Food Insecurity: No Food Insecurity (2025)    Received from [facility]    Hunger Vital Sign     Worried About Running Out of Food in the Last Year: Never true     Ran Out of Food in the Last Year: Never true   Transportation Needs: No Transportation Needs (2025)    Received from [facility]    PRAPARE - Transportation     Lack of Transportation (Medical): No     Lack of Transportation (Non-Medical): No   Physical Activity: Inactive (2024)    Received from [facility]    Exercise Vital Sign     Days of Exercise per Week: 0 days     Minutes of Exercise per Session: 0 min   Housing Stability: Low Risk  (2025)    Received from [facility]    Housing Stability Vital Sign     Unable to Pay for Housing in the Last Year: No     Number of Times Moved in the Last Year: 0     Homeless in the Last Year: No   [2]   Family History  Problem Relation Name Age of Onset    Diabetes Mother      Diverticulosis Brother

## 2025-05-27 ENCOUNTER — RESULTS FOLLOW-UP (OUTPATIENT)
Dept: FAMILY MEDICINE CLINIC | Age: 65
End: 2025-05-27

## 2025-05-27 DIAGNOSIS — E78.5 HYPERLIPIDEMIA, UNSPECIFIED HYPERLIPIDEMIA TYPE: ICD-10-CM

## 2025-05-27 DIAGNOSIS — N40.1 BPH WITH LOWER URINARY TRACT SYMPTOMS WITHOUT URINARY OBSTRUCTION: ICD-10-CM

## 2025-05-27 DIAGNOSIS — I42.9 CARDIOMYOPATHY, UNSPECIFIED TYPE (HCC): ICD-10-CM

## 2025-05-27 DIAGNOSIS — E78.2 MIXED HYPERLIPIDEMIA: Primary | ICD-10-CM

## 2025-05-27 DIAGNOSIS — F41.9 ANXIETY: ICD-10-CM

## 2025-05-27 LAB
ALBUMIN SERPL-MCNC: 4.2 G/DL (ref 3.5–4.6)
ALP SERPL-CCNC: 93 U/L (ref 35–104)
ALT SERPL-CCNC: 7 U/L (ref 0–41)
ANION GAP SERPL CALCULATED.3IONS-SCNC: 10 MEQ/L (ref 9–15)
AST SERPL-CCNC: 13 U/L (ref 0–40)
BASOPHILS # BLD: 0.1 K/UL (ref 0–0.2)
BASOPHILS NFR BLD: 2 %
BILIRUB SERPL-MCNC: 0.3 MG/DL (ref 0.2–0.7)
BUN SERPL-MCNC: 10 MG/DL (ref 8–23)
CALCIUM SERPL-MCNC: 9.1 MG/DL (ref 8.5–9.9)
CHLORIDE SERPL-SCNC: 104 MEQ/L (ref 95–107)
CHOLEST SERPL-MCNC: 227 MG/DL (ref 0–199)
CO2 SERPL-SCNC: 29 MEQ/L (ref 20–31)
CREAT SERPL-MCNC: 0.71 MG/DL (ref 0.7–1.2)
EOSINOPHIL # BLD: 0.1 K/UL (ref 0–0.7)
EOSINOPHIL NFR BLD: 2 %
ERYTHROCYTE [DISTWIDTH] IN BLOOD BY AUTOMATED COUNT: 12.6 % (ref 11.5–14.5)
GLOBULIN SER CALC-MCNC: 3.3 G/DL (ref 2.3–3.5)
GLUCOSE FASTING: 92 MG/DL (ref 70–99)
HCT VFR BLD AUTO: 43 % (ref 42–52)
HDLC SERPL-MCNC: 62 MG/DL (ref 40–59)
HGB BLD-MCNC: 14.1 G/DL (ref 14–18)
LDLC SERPL CALC-MCNC: 146 MG/DL (ref 0–129)
LYMPHOCYTES # BLD: 0.5 K/UL (ref 1–4.8)
LYMPHOCYTES NFR BLD: 11 %
MACROCYTES BLD QL SMEAR: ABNORMAL
MCH RBC QN AUTO: 32.3 PG (ref 27–31.3)
MCHC RBC AUTO-ENTMCNC: 32.8 % (ref 33–37)
MCV RBC AUTO: 98.6 FL (ref 79–92.2)
MONOCYTES # BLD: 0.5 K/UL (ref 0.2–0.8)
MONOCYTES NFR BLD: 15.2 %
NEUTROPHILS # BLD: 2 K/UL (ref 1.4–6.5)
NEUTS SEG NFR BLD: 65 %
PLATELET # BLD AUTO: 244 K/UL (ref 130–400)
PLATELET BLD QL SMEAR: ADEQUATE
POTASSIUM SERPL-SCNC: 4 MEQ/L (ref 3.4–4.9)
PROT SERPL-MCNC: 7.5 G/DL (ref 6.3–8)
PSA SERPL-MCNC: 5.13 NG/ML (ref 0–4)
RBC # BLD AUTO: 4.36 M/UL (ref 4.7–6.1)
SODIUM SERPL-SCNC: 143 MEQ/L (ref 135–144)
TRIGL SERPL-MCNC: 96 MG/DL (ref 0–150)
VARIANT LYMPHS NFR BLD: 5 %
WBC # BLD AUTO: 3.1 K/UL (ref 4.8–10.8)

## 2025-05-27 RX ORDER — ALPRAZOLAM 0.5 MG
0.5 TABLET ORAL 4 TIMES DAILY PRN
Qty: 120 TABLET | Refills: 0 | Status: SHIPPED | OUTPATIENT
Start: 2025-05-27 | End: 2025-06-26

## 2025-05-27 RX ORDER — ATORVASTATIN CALCIUM 10 MG/1
10 TABLET, FILM COATED ORAL DAILY
Qty: 90 TABLET | Refills: 1 | Status: SHIPPED | OUTPATIENT
Start: 2025-05-27

## 2025-05-27 NOTE — TELEPHONE ENCOUNTER
Pt calling and stating that the medication is supposed to be 120, not 90.  He's calling to remind [facility] to fill for the 120.

## 2025-05-30 ENCOUNTER — HOSPITAL ENCOUNTER (OUTPATIENT)
Dept: CARDIOLOGY | Facility: HOSPITAL | Age: 65
Discharge: HOME | End: 2025-05-30
Payer: MEDICARE

## 2025-05-30 ENCOUNTER — APPOINTMENT (OUTPATIENT)
Dept: CARDIOLOGY | Facility: CLINIC | Age: 65
End: 2025-05-30
Payer: MEDICARE

## 2025-05-30 VITALS
DIASTOLIC BLOOD PRESSURE: 64 MMHG | HEIGHT: 69 IN | HEART RATE: 63 BPM | BODY MASS INDEX: 23.99 KG/M2 | SYSTOLIC BLOOD PRESSURE: 118 MMHG | WEIGHT: 162 LBS

## 2025-05-30 DIAGNOSIS — I47.10 SVT (SUPRAVENTRICULAR TACHYCARDIA): ICD-10-CM

## 2025-05-30 DIAGNOSIS — Z71.89 ENCOUNTER FOR MEDICATION REVIEW AND COUNSELING: ICD-10-CM

## 2025-05-30 DIAGNOSIS — Z87.891 FORMER SMOKER: ICD-10-CM

## 2025-05-30 DIAGNOSIS — Z71.89 ENCOUNTER TO DISCUSS TREATMENT OPTIONS: ICD-10-CM

## 2025-05-30 DIAGNOSIS — E78.2 MIXED HYPERLIPIDEMIA: ICD-10-CM

## 2025-05-30 DIAGNOSIS — I42.8 NONISCHEMIC CARDIOMYOPATHY (MULTI): ICD-10-CM

## 2025-05-30 DIAGNOSIS — Z95.810 ICD (IMPLANTABLE CARDIOVERTER-DEFIBRILLATOR), SINGLE, IN SITU: Primary | ICD-10-CM

## 2025-05-30 DIAGNOSIS — I10 PRIMARY HYPERTENSION: ICD-10-CM

## 2025-05-30 DIAGNOSIS — Z95.810 AICD (AUTOMATIC CARDIOVERTER/DEFIBRILLATOR) PRESENT: ICD-10-CM

## 2025-05-30 DIAGNOSIS — D72.819 LEUKOPENIA, UNSPECIFIED TYPE: ICD-10-CM

## 2025-05-30 DIAGNOSIS — I47.20 VENTRICULAR TACHYCARDIA (MULTI): ICD-10-CM

## 2025-05-30 DIAGNOSIS — D64.9 ANEMIA, UNSPECIFIED TYPE: Primary | ICD-10-CM

## 2025-05-30 DIAGNOSIS — I49.01 VENTRICULAR FIBRILLATION (MULTI): ICD-10-CM

## 2025-05-30 PROCEDURE — 93282 PRGRMG EVAL IMPLANTABLE DFB: CPT | Performed by: INTERNAL MEDICINE

## 2025-05-30 PROCEDURE — 93282 PRGRMG EVAL IMPLANTABLE DFB: CPT

## 2025-05-30 PROCEDURE — 1159F MED LIST DOCD IN RCRD: CPT | Performed by: INTERNAL MEDICINE

## 2025-05-30 PROCEDURE — 93000 ELECTROCARDIOGRAM COMPLETE: CPT | Mod: DISTINCT PROCEDURAL SERVICE | Performed by: INTERNAL MEDICINE

## 2025-05-30 PROCEDURE — 1036F TOBACCO NON-USER: CPT | Performed by: INTERNAL MEDICINE

## 2025-05-30 PROCEDURE — 99205 OFFICE O/P NEW HI 60 MIN: CPT | Performed by: INTERNAL MEDICINE

## 2025-05-30 PROCEDURE — 3008F BODY MASS INDEX DOCD: CPT | Performed by: INTERNAL MEDICINE

## 2025-05-30 PROCEDURE — 3074F SYST BP LT 130 MM HG: CPT | Performed by: INTERNAL MEDICINE

## 2025-05-30 PROCEDURE — 3078F DIAST BP <80 MM HG: CPT | Performed by: INTERNAL MEDICINE

## 2025-05-30 ASSESSMENT — ENCOUNTER SYMPTOMS
PALPITATIONS: 0
DYSPNEA ON EXERTION: 0

## 2025-05-30 NOTE — PATIENT INSTRUCTIONS
Continue same medications/treatment.  Patient educated on proper medication use.  Patient educated on risk factor modification.  Please bring any lab results from other providers/physicians to your next appointment.    Please bring all medicines, vitamins, and herbal supplements with you when you come to the office.    Prescriptions will not be filled unless you are compliant with your follow up appointments or have a follow up appointment scheduled as per instruction of your physician. Refills should be requested at the time of your visit.    Follow up with Dr. Roberts in 11 months with device check   Continue remote checks as scheduled    SOWMYA CANAS RN, AM SCRIBING FOR AND IN THE PRESENCE OF DR. MALINDA ROBERTS MD, FACC, FACP, FHRS

## 2025-06-19 DIAGNOSIS — F41.9 ANXIETY: ICD-10-CM

## 2025-06-19 RX ORDER — ALPRAZOLAM 0.5 MG
0.5 TABLET ORAL 4 TIMES DAILY PRN
Qty: 120 TABLET | Refills: 0 | Status: SHIPPED | OUTPATIENT
Start: 2025-06-19 | End: 2025-07-19

## 2025-06-19 NOTE — TELEPHONE ENCOUNTER
Comments: my chart message sent    Last Office Visit (last PCP visit):   9/11/2024    Next Visit Date:  Future Appointments   Date Time Provider Department Center   8/7/2025  7:30 AM [facility] Owens APRN - CNP [facility] [address]   12/11/2025  9:15 AM Dennis Caraballo MD [facility]       **If hasn't been seen in over a year OR hasn't followed up according to last diabetes/ADHD visit, make appointment for patient before sending refill to provider.    Rx requested:  Requested Prescriptions     Pending Prescriptions Disp Refills    gabapentin (NEURONTIN) 600 MG tablet 120 tablet 5

## 2025-06-19 NOTE — TELEPHONE ENCOUNTER
Comments:     Last Office Visit (last PCP visit):   5/5/2025    Next Visit Date:  Future Appointments   Date Time Provider Department Center   8/7/2025  7:30 AM [facility] Owens, ELINA - CNP [facility]   12/11/2025  9:15 AM Dennis Caraballo MD [facility]       **If hasn't been seen in over a year OR hasn't followed up according to last diabetes/ADHD visit, make appointment for patient before sending refill to provider.    Rx requested:  Requested Prescriptions     Pending Prescriptions Disp Refills    ALPRAZolam (XANAX) 0.5 MG tablet 120 tablet 0     Sig: Take 1 tablet by mouth 4 times daily as needed for Sleep or Anxiety for up to 30 days. Max Daily Amount: 2 mg

## 2025-06-20 ENCOUNTER — PATIENT MESSAGE (OUTPATIENT)
Dept: CARDIOLOGY | Facility: CLINIC | Age: 65
End: 2025-06-20
Payer: MEDICARE

## 2025-06-20 DIAGNOSIS — I10 HYPERTENSION, UNSPECIFIED TYPE: ICD-10-CM

## 2025-06-20 RX ORDER — GABAPENTIN 600 MG/1
600 TABLET ORAL 4 TIMES DAILY
Qty: 120 TABLET | Refills: 5 | Status: SHIPPED | OUTPATIENT
Start: 2025-06-20 | End: 2025-12-17

## 2025-06-20 RX ORDER — CARVEDILOL 12.5 MG/1
12.5 TABLET ORAL 2 TIMES DAILY
Qty: 180 TABLET | Refills: 3 | Status: SHIPPED | OUTPATIENT
Start: 2025-06-20 | End: 2026-06-20

## 2025-06-20 NOTE — PATIENT COMMUNICATION
Received request for prescription refill for patient.  Patient follows with Dr. Tennille Roberts MD, FACC, FACP, RS     Request is for carvedilol   Is patient currently on medication- yes    Last OV- 5/30/25  Next OV- 4/28/26    Pended for signing and sent to provider.

## 2025-07-21 DIAGNOSIS — F41.9 ANXIETY: ICD-10-CM

## 2025-07-21 RX ORDER — ALPRAZOLAM 0.5 MG
TABLET ORAL
Qty: 120 TABLET | Refills: 0 | Status: SHIPPED | OUTPATIENT
Start: 2025-07-21 | End: 2025-08-20

## 2025-07-21 NOTE — TELEPHONE ENCOUNTER
Rx request   Requested Prescriptions     Pending Prescriptions Disp Refills    ALPRAZolam (XANAX) 0.5 MG tablet 120 tablet 0     Si-2 tabs PO up to three times daily. Intended supply 30 days     LOV 2025  Next Visit Date:  Future Appointments   Date Time Provider Department Center   2025  7:30 AM [facility] Owens APRN - CNP [facility]   2025  9:15 AM Dennis Caraballo MD [facility]

## 2025-07-30 ENCOUNTER — PATIENT MESSAGE (OUTPATIENT)
Dept: CARDIOLOGY | Facility: CLINIC | Age: 65
End: 2025-07-30
Payer: MEDICARE

## 2025-08-01 DIAGNOSIS — N52.9 ERECTILE DYSFUNCTION, UNSPECIFIED ERECTILE DYSFUNCTION TYPE: Primary | ICD-10-CM

## 2025-08-01 RX ORDER — SILDENAFIL CITRATE 20 MG/1
20 TABLET ORAL DAILY PRN
Qty: 5 TABLET | Refills: 1 | Status: SHIPPED | OUTPATIENT
Start: 2025-08-01

## 2025-08-04 SDOH — HEALTH STABILITY: PHYSICAL HEALTH: ON AVERAGE, HOW MANY MINUTES DO YOU ENGAGE IN EXERCISE AT THIS LEVEL?: 30 MIN

## 2025-08-04 SDOH — HEALTH STABILITY: PHYSICAL HEALTH: ON AVERAGE, HOW MANY DAYS PER WEEK DO YOU ENGAGE IN MODERATE TO STRENUOUS EXERCISE (LIKE A BRISK WALK)?: 5 DAYS

## 2025-08-04 ASSESSMENT — LIFESTYLE VARIABLES
HOW OFTEN DO YOU HAVE A DRINK CONTAINING ALCOHOL: NEVER
HOW MANY STANDARD DRINKS CONTAINING ALCOHOL DO YOU HAVE ON A TYPICAL DAY: 0
HOW OFTEN DO YOU HAVE SIX OR MORE DRINKS ON ONE OCCASION: 1
HOW OFTEN DO YOU HAVE A DRINK CONTAINING ALCOHOL: 1
HOW MANY STANDARD DRINKS CONTAINING ALCOHOL DO YOU HAVE ON A TYPICAL DAY: PATIENT DOES NOT DRINK

## 2025-08-04 ASSESSMENT — PATIENT HEALTH QUESTIONNAIRE - PHQ9
1. LITTLE INTEREST OR PLEASURE IN DOING THINGS: NOT AT ALL
SUM OF ALL RESPONSES TO PHQ QUESTIONS 1-9: 0
SUM OF ALL RESPONSES TO PHQ QUESTIONS 1-9: 0
2. FEELING DOWN, DEPRESSED OR HOPELESS: NOT AT ALL
SUM OF ALL RESPONSES TO PHQ QUESTIONS 1-9: 0
SUM OF ALL RESPONSES TO PHQ QUESTIONS 1-9: 0

## 2025-08-07 ENCOUNTER — OFFICE VISIT (OUTPATIENT)
Dept: FAMILY MEDICINE CLINIC | Age: 65
End: 2025-08-07
Payer: MEDICARE

## 2025-08-07 VITALS
HEART RATE: 62 BPM | OXYGEN SATURATION: 100 % | BODY MASS INDEX: 20.38 KG/M2 | HEIGHT: 71 IN | TEMPERATURE: 97.9 F | SYSTOLIC BLOOD PRESSURE: 104 MMHG | WEIGHT: 145.6 LBS | DIASTOLIC BLOOD PRESSURE: 62 MMHG

## 2025-08-07 DIAGNOSIS — M54.50 CHRONIC LOW BACK PAIN, UNSPECIFIED BACK PAIN LATERALITY, UNSPECIFIED WHETHER SCIATICA PRESENT: Primary | ICD-10-CM

## 2025-08-07 DIAGNOSIS — R25.1 TREMOR: ICD-10-CM

## 2025-08-07 DIAGNOSIS — G89.29 CHRONIC LOW BACK PAIN, UNSPECIFIED BACK PAIN LATERALITY, UNSPECIFIED WHETHER SCIATICA PRESENT: Primary | ICD-10-CM

## 2025-08-07 PROCEDURE — 1159F MED LIST DOCD IN RCRD: CPT | Performed by: NURSE PRACTITIONER

## 2025-08-07 PROCEDURE — 99214 OFFICE O/P EST MOD 30 MIN: CPT | Performed by: NURSE PRACTITIONER

## 2025-08-07 PROCEDURE — G8427 DOCREV CUR MEDS BY ELIG CLIN: HCPCS | Performed by: NURSE PRACTITIONER

## 2025-08-07 PROCEDURE — 3017F COLORECTAL CA SCREEN DOC REV: CPT | Performed by: NURSE PRACTITIONER

## 2025-08-07 PROCEDURE — 1036F TOBACCO NON-USER: CPT | Performed by: NURSE PRACTITIONER

## 2025-08-07 PROCEDURE — G8420 CALC BMI NORM PARAMETERS: HCPCS | Performed by: NURSE PRACTITIONER

## 2025-08-07 PROCEDURE — 1123F ACP DISCUSS/DSCN MKR DOCD: CPT | Performed by: NURSE PRACTITIONER

## 2025-08-07 ASSESSMENT — ENCOUNTER SYMPTOMS
NAUSEA: 0
CONSTIPATION: 0
DIARRHEA: 0
SHORTNESS OF BREATH: 0
COLOR CHANGE: 0
BACK PAIN: 1
CHEST TIGHTNESS: 0
ABDOMINAL PAIN: 0
VOMITING: 0

## 2025-08-12 DIAGNOSIS — F41.9 ANXIETY: ICD-10-CM

## 2025-08-13 RX ORDER — ALPRAZOLAM 0.5 MG
TABLET ORAL
Qty: 120 TABLET | Refills: 0 | Status: SHIPPED | OUTPATIENT
Start: 2025-08-13 | End: 2025-09-11

## 2025-08-21 DIAGNOSIS — I10 HYPERTENSION, UNSPECIFIED TYPE: ICD-10-CM

## 2025-08-22 RX ORDER — LISINOPRIL 2.5 MG/1
2.5 TABLET ORAL DAILY
Qty: 90 TABLET | Refills: 3 | Status: SHIPPED | OUTPATIENT
Start: 2025-08-22

## 2025-08-26 ENCOUNTER — APPOINTMENT (OUTPATIENT)
Dept: CT IMAGING | Age: 65
End: 2025-08-26
Payer: MEDICARE

## 2025-08-28 ENCOUNTER — HOSPITAL ENCOUNTER (OUTPATIENT)
Dept: CT IMAGING | Age: 65
Discharge: HOME | End: 2025-08-30
Payer: MEDICARE

## 2025-08-28 ENCOUNTER — RESULTS FOLLOW-UP (OUTPATIENT)
Dept: FAMILY MEDICINE CLINIC | Age: 65
End: 2025-08-28

## 2025-08-28 DIAGNOSIS — M54.50 CHRONIC LOW BACK PAIN, UNSPECIFIED BACK PAIN LATERALITY, UNSPECIFIED WHETHER SCIATICA PRESENT: ICD-10-CM

## 2025-08-28 DIAGNOSIS — R25.1 TREMOR: ICD-10-CM

## 2025-08-28 DIAGNOSIS — G89.29 CHRONIC LOW BACK PAIN, UNSPECIFIED BACK PAIN LATERALITY, UNSPECIFIED WHETHER SCIATICA PRESENT: ICD-10-CM

## 2025-08-28 LAB
CRP SERPL HS-MCNC: <3 MG/L (ref 0–5)
ERYTHROCYTE [SEDIMENTATION RATE] IN BLOOD BY WESTERGREN METHOD: <1 MM (ref 0–20)
MAGNESIUM SERPL-MCNC: 2 MG/DL (ref 1.7–2.4)
TSH REFLEX: 1.91 UIU/ML (ref 0.44–3.86)

## 2025-08-28 PROCEDURE — 72131 CT LUMBAR SPINE W/O DYE: CPT

## 2025-08-29 LAB — VITAMIN B-12: 1178 PG/ML (ref 232–1245)

## 2025-09-03 DIAGNOSIS — M51.362 DEGENERATION OF INTERVERTEBRAL DISC OF LUMBAR REGION WITH DISCOGENIC BACK PAIN AND LOWER EXTREMITY PAIN: Primary | ICD-10-CM

## 2026-04-28 ENCOUNTER — APPOINTMENT (OUTPATIENT)
Dept: CARDIOLOGY | Facility: CLINIC | Age: 66
End: 2026-04-28
Payer: MEDICARE